# Patient Record
Sex: FEMALE | Race: BLACK OR AFRICAN AMERICAN
[De-identification: names, ages, dates, MRNs, and addresses within clinical notes are randomized per-mention and may not be internally consistent; named-entity substitution may affect disease eponyms.]

---

## 2019-06-30 ENCOUNTER — HOSPITAL ENCOUNTER (INPATIENT)
Dept: HOSPITAL 72 - EMR | Age: 79
LOS: 4 days | Discharge: HOME | DRG: 193 | End: 2019-07-04
Payer: MEDICARE

## 2019-06-30 VITALS — DIASTOLIC BLOOD PRESSURE: 73 MMHG | SYSTOLIC BLOOD PRESSURE: 148 MMHG

## 2019-06-30 VITALS — BODY MASS INDEX: 23.73 KG/M2 | WEIGHT: 139 LBS | HEIGHT: 64 IN

## 2019-06-30 VITALS — DIASTOLIC BLOOD PRESSURE: 84 MMHG | SYSTOLIC BLOOD PRESSURE: 147 MMHG

## 2019-06-30 VITALS — DIASTOLIC BLOOD PRESSURE: 95 MMHG | SYSTOLIC BLOOD PRESSURE: 146 MMHG

## 2019-06-30 VITALS — DIASTOLIC BLOOD PRESSURE: 97 MMHG | SYSTOLIC BLOOD PRESSURE: 177 MMHG

## 2019-06-30 DIAGNOSIS — I21.4: ICD-10-CM

## 2019-06-30 DIAGNOSIS — J18.9: Primary | ICD-10-CM

## 2019-06-30 DIAGNOSIS — I25.5: ICD-10-CM

## 2019-06-30 DIAGNOSIS — N17.9: ICD-10-CM

## 2019-06-30 DIAGNOSIS — R19.7: ICD-10-CM

## 2019-06-30 DIAGNOSIS — I11.0: ICD-10-CM

## 2019-06-30 DIAGNOSIS — I47.2: ICD-10-CM

## 2019-06-30 DIAGNOSIS — F01.50: ICD-10-CM

## 2019-06-30 DIAGNOSIS — I50.32: ICD-10-CM

## 2019-06-30 DIAGNOSIS — R74.0: ICD-10-CM

## 2019-06-30 LAB
ADD MANUAL DIFF: NO
ALBUMIN SERPL-MCNC: 4 G/DL (ref 3.4–5)
ALBUMIN/GLOB SERPL: 1.2 {RATIO} (ref 1–2.7)
ALP SERPL-CCNC: 120 U/L (ref 46–116)
ALT SERPL-CCNC: 225 U/L (ref 12–78)
ANION GAP SERPL CALC-SCNC: 10 MMOL/L (ref 5–15)
AST SERPL-CCNC: 119 U/L (ref 15–37)
BASOPHILS NFR BLD AUTO: 1.1 % (ref 0–2)
BILIRUB DIRECT SERPL-MCNC: 0.3 MG/DL (ref 0–0.3)
BILIRUB SERPL-MCNC: 1.1 MG/DL (ref 0.2–1)
BUN SERPL-MCNC: 31 MG/DL (ref 7–18)
CALCIUM SERPL-MCNC: 9 MG/DL (ref 8.5–10.1)
CHLORIDE SERPL-SCNC: 104 MMOL/L (ref 98–107)
CK MB SERPL-MCNC: 2.7 NG/ML (ref 0–3.6)
CK SERPL-CCNC: 220 U/L (ref 26–308)
CO2 SERPL-SCNC: 25 MMOL/L (ref 21–32)
CREAT SERPL-MCNC: 1.7 MG/DL (ref 0.55–1.3)
EOSINOPHIL NFR BLD AUTO: 0.1 % (ref 0–3)
ERYTHROCYTE [DISTWIDTH] IN BLOOD BY AUTOMATED COUNT: 14.4 % (ref 11.6–14.8)
GLOBULIN SER-MCNC: 3.3 G/DL
HCT VFR BLD CALC: 37.3 % (ref 37–47)
HGB BLD-MCNC: 11.9 G/DL (ref 12–16)
LYMPHOCYTES NFR BLD AUTO: 20.6 % (ref 20–45)
MCV RBC AUTO: 95 FL (ref 80–99)
MONOCYTES NFR BLD AUTO: 10.4 % (ref 1–10)
NEUTROPHILS NFR BLD AUTO: 67.9 % (ref 45–75)
PLATELET # BLD: 201 K/UL (ref 150–450)
POTASSIUM SERPL-SCNC: 4 MMOL/L (ref 3.5–5.1)
RBC # BLD AUTO: 3.92 M/UL (ref 4.2–5.4)
SODIUM SERPL-SCNC: 139 MMOL/L (ref 136–145)
WBC # BLD AUTO: 5.2 K/UL (ref 4.8–10.8)

## 2019-06-30 PROCEDURE — 96365 THER/PROPH/DIAG IV INF INIT: CPT

## 2019-06-30 PROCEDURE — 85651 RBC SED RATE NONAUTOMATED: CPT

## 2019-06-30 PROCEDURE — 71045 X-RAY EXAM CHEST 1 VIEW: CPT

## 2019-06-30 PROCEDURE — 87340 HEPATITIS B SURFACE AG IA: CPT

## 2019-06-30 PROCEDURE — 84484 ASSAY OF TROPONIN QUANT: CPT

## 2019-06-30 PROCEDURE — 87517 HEPATITIS B DNA QUANT: CPT

## 2019-06-30 PROCEDURE — 86695 HERPES SIMPLEX TYPE 1 TEST: CPT

## 2019-06-30 PROCEDURE — 85025 COMPLETE CBC W/AUTO DIFF WBC: CPT

## 2019-06-30 PROCEDURE — 83735 ASSAY OF MAGNESIUM: CPT

## 2019-06-30 PROCEDURE — 76700 US EXAM ABDOM COMPLETE: CPT

## 2019-06-30 PROCEDURE — 80053 COMPREHEN METABOLIC PANEL: CPT

## 2019-06-30 PROCEDURE — 86708 HEPATITIS A ANTIBODY: CPT

## 2019-06-30 PROCEDURE — 86140 C-REACTIVE PROTEIN: CPT

## 2019-06-30 PROCEDURE — 86709 HEPATITIS A IGM ANTIBODY: CPT

## 2019-06-30 PROCEDURE — 82150 ASSAY OF AMYLASE: CPT

## 2019-06-30 PROCEDURE — 83605 ASSAY OF LACTIC ACID: CPT

## 2019-06-30 PROCEDURE — 36415 COLL VENOUS BLD VENIPUNCTURE: CPT

## 2019-06-30 PROCEDURE — 86665 EPSTEIN-BARR CAPSID VCA: CPT

## 2019-06-30 PROCEDURE — 93005 ELECTROCARDIOGRAM TRACING: CPT

## 2019-06-30 PROCEDURE — 82553 CREATINE MB FRACTION: CPT

## 2019-06-30 PROCEDURE — 82248 BILIRUBIN DIRECT: CPT

## 2019-06-30 PROCEDURE — 83880 ASSAY OF NATRIURETIC PEPTIDE: CPT

## 2019-06-30 PROCEDURE — 83690 ASSAY OF LIPASE: CPT

## 2019-06-30 PROCEDURE — 82550 ASSAY OF CK (CPK): CPT

## 2019-06-30 PROCEDURE — 96361 HYDRATE IV INFUSION ADD-ON: CPT

## 2019-06-30 PROCEDURE — 86803 HEPATITIS C AB TEST: CPT

## 2019-06-30 PROCEDURE — 99291 CRITICAL CARE FIRST HOUR: CPT

## 2019-06-30 PROCEDURE — 74176 CT ABD & PELVIS W/O CONTRAST: CPT

## 2019-06-30 PROCEDURE — 86039 ANTINUCLEAR ANTIBODIES (ANA): CPT

## 2019-06-30 PROCEDURE — 87040 BLOOD CULTURE FOR BACTERIA: CPT

## 2019-06-30 PROCEDURE — 93306 TTE W/DOPPLER COMPLETE: CPT

## 2019-06-30 PROCEDURE — 87798 DETECT AGENT NOS DNA AMP: CPT

## 2019-06-30 RX ADMIN — DEXTROSE MONOHYDRATE SCH MLS/HR: 50 INJECTION, SOLUTION INTRAVENOUS at 23:55

## 2019-06-30 RX ADMIN — POTASSIUM CHLORIDE ONE MLS/HR: 200 INJECTION, SOLUTION INTRAVENOUS at 10:37

## 2019-06-30 RX ADMIN — DEXTROSE MONOHYDRATE SCH MLS/HR: 50 INJECTION, SOLUTION INTRAVENOUS at 16:45

## 2019-06-30 RX ADMIN — POTASSIUM CHLORIDE ONE MLS/HR: 200 INJECTION, SOLUTION INTRAVENOUS at 10:30

## 2019-06-30 NOTE — DIAGNOSTIC IMAGING REPORT
EXAM:

  XR Chest, 1 View.

 

CLINICAL HISTORY:

  COUGH

 

TECHNIQUE:

  Frontal view of the chest.

 

COMPARISON:

  No relevant prior studies available.

 

FINDINGS:

  Lungs: Suspected retrocardiac atelectasis versus airspace opacity, 

concerning for left lower lobe pneumonia.  Consider lateral chest 

radiograph for confirmation.  No pulmonary edema.  Scarring or 

atelectasis seen within the right lung base as well.

  Pleural spaces:  Unremarkable.  No pneumothorax.

  Heart: Mild cardiomegaly.

  Mediastinum: No mediastinal widening or shift.

  Bones: No acute osseous abnormality..

 

IMPRESSION:     

Possible left lower lobe pneumonia versus atelectasis.  Please correlate 

with respiratory exam.  Consider lateral chest radiograph for 

confirmation.

 

Mild cardiomegaly.

## 2019-06-30 NOTE — EMERGENCY ROOM REPORT
History of Present Illness


General


Chief Complaint:  Generalized Weakness


Source:  Patient, Family Member





Present Illness


HPI


Patient presents with family for reports of general weakness


Family also reports increased cough patient reports having dry throat


Denies any chest pain denies any vomiting or diarrhea


Patient is here visiting family


Denies any obvious fever denies any neck pain or photophobia patient complains 

of some mild epigastric discomfort as well


Denies any


Recent travel or trauma


Denies any neck pain or photophobia


Allergies:  


Coded Allergies:  


     No Known Allergies (Unverified , 6/30/19)





Patient History


Past Medical History:  see triage record


Pertinent Family History:  none


Reviewed Nursing Documentation:  PMH: Agreed; PSxH: Agreed





Nursing Documentation-PMH


Past Medical History:  No Stated History





Review of Systems


All Other Systems:  negative except mentioned in HPI





Physical Exam





Vital Signs








  Date Time  Temp Pulse Resp B/P (MAP) Pulse Ox O2 Delivery O2 Flow Rate FiO2


 


6/30/19 08:45 97.7 81 16 177/97 (123) 96 Room Air  








Sp02 EP Interpretation:  reviewed, normal


General Appearance:  well appearing, no apparent distress


Head:  normocephalic, atraumatic


Eyes:  bilateral eye PERRL, bilateral eye EOMI


ENT:  hearing grossly normal, normal pharynx, TMs + canals normal, uvula midline


Neck:  full range of motion, supple, no meningismus, no bony tend


Respiratory:  lungs clear, normal breath sounds, no rhonchi, no respiratory 

distress, no retraction, no accessory muscle use


Cardiovascular #1:  normal peripheral pulses, regular rate, rhythm, no edema, 

no gallop, no JVD, no murmur


Gastrointestinal:  normal bowel sounds, non tender, soft, no mass, no 

organomegaly, non-distended, no guarding, no hernia, no pulsatile mass, no 

rebound


Genitourinary:  no CVA tenderness


Musculoskeletal:  normal inspection


Neurologic:  oriented x3, responsive, CNs III-XII nml as tested, motor strength/

tone normal, sensory intact


Psychiatric:  mood/affect normal


Lymphatic:  normal inspection, no adenopathy





Procedures


Critical Care Time


Critical Care Time


40 minutes for multiple re-evaluations critical presentation with critical 

findings concerning for cardiac injury possible death not including any 

procedural time





Medical Decision Making


Diagnostic Impression:  


 Primary Impression:  


 Elevated troponin


 Additional Impressions:  


 Pneumonia


 Elevated transaminase level


ER Course


Patient is a fairly complex patient with multiple differential to consideration 

including but not limited to cardiac cardiopulmonary and vascular emergencies





Other infectious pathology also entertained


Patient's work-up reveals significant abnormalities including elevated troponin 

levels liver function tests are also elevated


Patient's x-ray shows right-sided infiltrate


With some question of mild congestion patient does not have any history of CHF 

and the clinical exam does not reveal any edema


Patient's


IV hydration status however it needs to be closely monitored as not to over 

hydrate the patient broad-spectrum antibiotics initiated





Ultrasound is ordered for inpatient care


And general surgery consultations also made





Labs








Test


  6/30/19


09:30 6/30/19


11:56


 


White Blood Count


  5.2 K/UL


(4.8-10.8) 


 


 


Red Blood Count


  3.92 M/UL


(4.20-5.40) 


 


 


Hemoglobin


  11.9 G/DL


(12.0-16.0) 


 


 


Hematocrit


  37.3 %


(37.0-47.0) 


 


 


Mean Corpuscular Volume 95 FL (80-99)  


 


Mean Corpuscular Hemoglobin


  30.3 PG


(27.0-31.0) 


 


 


Mean Corpuscular Hemoglobin


Concent 31.9 G/DL


(32.0-36.0) 


 


 


Red Cell Distribution Width


  14.4 %


(11.6-14.8) 


 


 


Platelet Count


  201 K/UL


(150-450) 


 


 


Mean Platelet Volume


  8.4 FL


(6.5-10.1) 


 


 


Neutrophils (%) (Auto)


  67.9 %


(45.0-75.0) 


 


 


Lymphocytes (%) (Auto)


  20.6 %


(20.0-45.0) 


 


 


Monocytes (%) (Auto)


  10.4 %


(1.0-10.0) 


 


 


Eosinophils (%) (Auto)


  0.1 %


(0.0-3.0) 


 


 


Basophils (%) (Auto)


  1.1 %


(0.0-2.0) 


 


 


Sodium Level


  139 MMOL/L


(136-145) 


 


 


Potassium Level


  4.0 MMOL/L


(3.5-5.1) 


 


 


Chloride Level


  104 MMOL/L


() 


 


 


Carbon Dioxide Level


  25 MMOL/L


(21-32) 


 


 


Anion Gap


  10 mmol/L


(5-15) 


 


 


Blood Urea Nitrogen


  31 mg/dL


(7-18) 


 


 


Creatinine


  1.7 MG/DL


(0.55-1.30) 


 


 


Estimat Glomerular Filtration


Rate  mL/min (>60) 


  


 


 


Glucose Level


  167 MG/DL


() 


 


 


Lactic Acid Level


  2.20 mmol/L


(0.4-2.0) 1.50 mmol/L


(0.66-2.22)


 


Calcium Level


  9.0 MG/DL


(8.5-10.1) 


 


 


Total Bilirubin


  1.1 MG/DL


(0.2-1.0) 


 


 


Direct Bilirubin


  0.3 MG/DL


(0.0-0.3) 


 


 


Aspartate Amino Transf


(AST/SGOT) 119 U/L


(15-37) 


 


 


Alanine Aminotransferase


(ALT/SGPT) 225 U/L


(12-78) 


 


 


Alkaline Phosphatase


  120 U/L


() 


 


 


Total Creatine Kinase


  220 U/L


() 


 


 


Creatine Kinase MB


  2.7 NG/ML


(0.0-3.6) 


 


 


Creatine Kinase MB Relative


Index 1.2 


  


 


 


Troponin I


  0.069 ng/mL


(0.000-0.056) 


 


 


Total Protein


  7.3 G/DL


(6.4-8.2) 


 


 


Albumin


  4.0 G/DL


(3.4-5.0) 


 


 


Globulin 3.3 g/dL  


 


Albumin/Globulin Ratio 1.2 (1.0-2.7)  


 


Lipase


  136 U/L


() 


 








EKG Diagnostic Results


Rate:  normal


Rhythm:  NSR


ST Segments:  other - Nonspecific ST/T wave changes





Rhythm Strip Diag. Results


EP Interpretation:  yes


Rate:  78


Rhythm:  NSR, no PVC's, no ectopy





Chest X-Ray Diagnostic Results


Chest X-Ray Diagnostic Results :  


   Chest X-Ray Ordered:  Yes


   # of Views/Limited/Complete:  1 View


   Indication:  Shortness of Breath


   EP Interpretation:  Yes


   Interpretation:  no effusion, no pneumothorax, other - Left lower lobe 

atelectasis cardiomegaly


   Impression:  Other - Left lower lobe infiltrate


   Electronically Signed by:  Anson Rebolledo DO





Last Vital Signs








  Date Time  Temp Pulse Resp B/P (MAP) Pulse Ox O2 Delivery O2 Flow Rate FiO2


 


6/30/19 08:45 97.7 81 16 177/97 (123) 96 Room Air  








Status:  improved


Disposition:  ADMITTED AS INPATIENT


Condition:  Serious











Anson Rebolledo DO Jun 30, 2019 09:20

## 2019-06-30 NOTE — NUR
NURSE NOTES:

Pt. came on the floor via gurney from ER. AOx3. RA, denies any pain or SOB. Cardiac monitor 
applied. IV on R AC 20g patent and intact. Skin intact. Belongings to be sent home with son. 
Pt. made comfortable in bed. Oriented Pt. to room and hospital protocols. Fall precaution in 
place. Bed on low position, side rails upx2, brakes engaged. Call light within reach. 
Verbalized understanding.

## 2019-06-30 NOTE — CONSULTATION
History of Present Illness


General


Date patient seen:  Jun 30, 2019


Reason for Hospitalization:  Generalized Weakness





Present Illness


HPI


78 year old very pleasant female presented with family with complaints of 

worsening cough for 2 weeks.  first noted a little cough 2 weeks ago and since 

worsening.  was feeling weak and unwell so came to ED for evaluation.  no n/v/f/

c.  having diarrhea as well.  in ED noted to have abnormal lft's surgery called 

to evaluate. patient seen, chart reviewed, patient examined.


Allergies:  


Coded Allergies:  


     No Known Allergies (Unverified , 6/30/19)





Medication History


Scheduled


No Known Medications* (NKM - No Known Medications*), 0 ., (Reported)





Patient History


History Provided By:  Patient, Family Member, Medical Record, PMD


Healthcare decision maker


Rudolph Boles (son)


Resuscitation status





Advanced Directive on File








Past Medical/Surgical History


Past Medical/Surgical History:  


(1) Pneumonia


(2) Elevated transaminase level


(3) Elevated troponin





Review of Systems


Review of Symptoms


General ROS: no weight loss or fever


Psychological ROS: no depression or mood changes, no memory loss


Ophthalmic ROS: no visual changes or eye irritation


ENT ROS: no nasal congestion, hearing loss, dizziness


Allergy and Immunology ROS: no allergic symptoms or urticaria


Hematological and Lymphatic ROS: no swollen glands, unusual bleeding or bruising


Endocrine ROS: no polyuria, polydipsia, weight changes, temperature intolerance


Respiratory ROS: no cough, shortness of breath, or wheezing


Cardiovascular ROS: no chest pain or dyspnea on exertion


Gastrointestinal ROS: denies abdominal pain, no bright red blood in stool.


Musculoskeletal ROS: no myalgias or arthralgias


Neurological ROS: no TIA or stroke symptoms


Dermatological ROS: no new or changing skin lesions, rashes or pruritis





Physical Exam


Physical Exam


General appearance:  alert, cooperative, no distress, appears stated age


Head:  Normocephalic, without obvious abnormality, atraumatic


Eyes:  conjunctivae/corneas clear. PERRL, EOM's intact. Fundi benign


Throat:  Lips, mucosa, and tongue normal. Teeth and gums normal


Neck:  supple, symmetrical, trachea midline, no adenopathy, thyroid: not 

enlarged, symmetric, no tenderness/mass/nodules, no carotid bruit and no JVD


Lungs:  clear to auscultation bilaterally


Heart:  regular rate and rhythm, S1, S2 normal, no murmur, click, rub or gallop


Abdomen:  soft, mild RUQ discomfort/tender. Bowel sounds normal. No masses,  no 

organomegaly


Extremities:  extremities normal, atraumatic, no cyanosis or edema


Pulses:  2+ and symmetric


Skin:  Skin color, texture, turgor normal. No rashes or lesions


Neurologic:  Grossly normal





Last 24 Hour Vital Signs








  Date Time  Temp Pulse Resp B/P (MAP) Pulse Ox O2 Delivery O2 Flow Rate FiO2


 


6/30/19 13:05      Room Air  


 


6/30/19 12:34 98.0 84 20 146/68 100 Room Air  


 


6/30/19 09:00 97.7 87 16 177/97 96 Room Air  


 


6/30/19 09:00  81 16   Room Air  


 


6/30/19 08:45 97.7 81 16 177/97 (123) 96 Room Air  











Laboratory Tests








Test


  6/30/19


09:30 6/30/19


11:56


 


White Blood Count


  5.2 K/UL


(4.8-10.8) 


 


 


Red Blood Count


  3.92 M/UL


(4.20-5.40)  L 


 


 


Hemoglobin


  11.9 G/DL


(12.0-16.0)  L 


 


 


Hematocrit


  37.3 %


(37.0-47.0) 


 


 


Mean Corpuscular Volume 95 FL (80-99)   


 


Mean Corpuscular Hemoglobin


  30.3 PG


(27.0-31.0) 


 


 


Mean Corpuscular Hemoglobin


Concent 31.9 G/DL


(32.0-36.0)  L 


 


 


Red Cell Distribution Width


  14.4 %


(11.6-14.8) 


 


 


Platelet Count


  201 K/UL


(150-450) 


 


 


Mean Platelet Volume


  8.4 FL


(6.5-10.1) 


 


 


Neutrophils (%) (Auto)


  67.9 %


(45.0-75.0) 


 


 


Lymphocytes (%) (Auto)


  20.6 %


(20.0-45.0) 


 


 


Monocytes (%) (Auto)


  10.4 %


(1.0-10.0)  H 


 


 


Eosinophils (%) (Auto)


  0.1 %


(0.0-3.0) 


 


 


Basophils (%) (Auto)


  1.1 %


(0.0-2.0) 


 


 


Sodium Level


  139 MMOL/L


(136-145) 


 


 


Potassium Level


  4.0 MMOL/L


(3.5-5.1) 


 


 


Chloride Level


  104 MMOL/L


() 


 


 


Carbon Dioxide Level


  25 MMOL/L


(21-32) 


 


 


Anion Gap


  10 mmol/L


(5-15) 


 


 


Blood Urea Nitrogen


  31 mg/dL


(7-18)  H 


 


 


Creatinine


  1.7 MG/DL


(0.55-1.30)  H 


 


 


Estimat Glomerular Filtration


Rate  mL/min (>60)  


  


 


 


Glucose Level


  167 MG/DL


()  H 


 


 


Lactic Acid Level


  2.20 mmol/L


(0.4-2.0)  H 1.50 mmol/L


(0.66-2.22)


 


Calcium Level


  9.0 MG/DL


(8.5-10.1) 


 


 


Total Bilirubin


  1.1 MG/DL


(0.2-1.0)  H 


 


 


Direct Bilirubin


  0.3 MG/DL


(0.0-0.3) 


 


 


Aspartate Amino Transf


(AST/SGOT) 119 U/L


(15-37)  H 


 


 


Alanine Aminotransferase


(ALT/SGPT) 225 U/L


(12-78)  H 


 


 


Alkaline Phosphatase


  120 U/L


()  H 


 


 


Total Creatine Kinase


  220 U/L


() 


 


 


Creatine Kinase MB


  2.7 NG/ML


(0.0-3.6) 


 


 


Creatine Kinase MB Relative


Index 1.2  


  


 


 


Troponin I


  0.069 ng/mL


(0.000-0.056) 


 


 


Total Protein


  7.3 G/DL


(6.4-8.2) 


 


 


Albumin


  4.0 G/DL


(3.4-5.0) 


 


 


Globulin 3.3 g/dL   


 


Albumin/Globulin Ratio 1.2 (1.0-2.7)   


 


Lipase


  136 U/L


() 


 








Height (Feet):  5


Height (Inches):  4.00


Weight (Pounds):  140





Assessment/Plan


Problem List:  


(1) Pneumonia


ICD Codes:  J18.9 - Pneumonia, unspecified organism


SNOMED:  454353050, 308936308


(2) Elevated transaminase level


Assessment & Plan:  78F with elevated lft's


etiology unknown


diarrhea


pna


mild RUQ discomfort





US abd complete ordered and pending


will review meds


trend labs


okay for diet


thank you


will follow with recs


ICD Codes:  R74.0 - Nonspecific elevation of levels of transaminase and lactic 

acid dehydrogenase [LDH]


SNOMED:  186600296, 079345044


(3) Elevated troponin


ICD Codes:  R74.8 - Abnormal levels of other serum enzymes


SNOMED:  431404951, 205785800, [id number]











Yonis Dickens Jun 30, 2019 13:45

## 2019-06-30 NOTE — NUR
ED Nurse Note:



TELE UNIT CALLED FOR PT REPORT. REPORT GIVEN TO LOPEZ BORJAS. PER PATRICIA, CHARGE 
NURSE, BED IS NOT READY. WILL CALL BACK WHEN READY.

## 2019-06-30 NOTE — NUR
ED Nurse Note:



Patient walked in to ER with son from son's home due to general weakness. pt 
aao x4 and ambulatory. skin clean and intact. calm and cooperative. [place] 
[place] state and visiting her son but noted increased weakness. pt denied 
pain. pt is in gown and on cardiac monitor.

## 2019-06-30 NOTE — NUR
NURSE NOTES:

patient received. patient in no acute distress at this time. patient complains of no pain at 
this time.  patient awake alert and oriented x3-4. patient fall precaution, band on arm. 
patient oriented to room and call light. IV intact patent and asymptomatic. bed in lowest 
position and locked. call light within reach. will continue to monitor.

## 2019-06-30 NOTE — HISTORY AND PHYSICAL REPORT
DATE OF ADMISSION:  06/30/2019

HISTORY OF PRESENT ILLNESS:  Abdominal pain and cough.



HISTORY OF PRESENT ILLNESS:  The patient is a 78-year-old female.  She has

no past medical history presented with complaints of one week of cough and

three days of abdominal pain.  According to the patient, she has had a

nonproductive cough for the last week.  Three days ago, she has developed

abdominal pain with some diarrhea.  She eventually presented to the

emergency room.  On evaluation there, she had an x-ray that showed a left

lower lobe infiltrate.  She also was noted to have acute renal failure

with a creatinine of 1.7 and elevated liver function tests and troponin of

0.069.  The patient has been started on IV hydration, broad-spectrum IV

antibiotics.  She is now admitted for further evaluation and care.  The

patient states she has abdominal pain, it has been intermittent.  Diarrhea

has been mostly greenish and watery.  The patient denies any melena or

bright red blood.



PAST MEDICAL HISTORY:  None.



PAST SURGICAL HISTORY:  None.



CURRENT MEDICATIONS:  None.



FAMILY HISTORY:  None.



SOCIAL HISTORY:  The patient drinks socially.  No drugs.



REVIEW OF SYSTEMS:  GENERAL:  No fevers, chills.    HEENT:  No headaches or

visual changes.  CARDIOPULMONARY:  No chest pain.  Positive shortness of

breath, cough.    GASTROINTESTINAL:  Positive abdominal pain and diarrhea.

GENITOURINARY:  No urgency or frequency.  MUSCULOSKELETAL:  No joint pain

or swelling.  NEUROLOGIC:  No evidence of seizures.



PHYSICAL EXAMINATION:

VITAL SIGNS:  Temperature 97.7, pulse 78, respirations 20, and blood

pressure 140/73.

GENERAL:  The patient is well developed, no apparent distress.

HEART:  Regular rate and rhythm.

LUNGS:  Clear.

ABDOMEN:  Soft.  Minimally tender in the right upper quadrant.  There is no

rebound or guarding.

EXTREMITIES:  Without clubbing, cyanosis, or edema.



LABORATORY DATA:  Sodium 139, potassium 4, BUN 31, creatinine 1.7.

Bilirubin of 1.1.  , ALT of 225, alkaline phosphatase 120.  Lactic

acid was 2.2.  Troponin 0.069.  EKG showed a sinus rhythm.



ASSESSMENT:  This is a pleasant female, admitted with complaints of cough,

secondary to pneumonia, acute renal failure, diarrhea, and transaminitis

unclear etiology, possible acute myocardial infarction.



PLAN:  IV hydration.  Broad-spectrum IV antibiotics.  Monitor renal

function.  CT scan of the abdomen.  Surgical and Cardiology and Infectious

Disease consultations to be obtained.  We will monitor the patient's chest

x-ray.  Cultures will be followed up.  LFTs will also be monitored.  The

patient will be started on DVT and stress ulcer prophylaxes.









  ______________________________________________

  Marky Kohler M.D.





DR:  RICHARD

D:  06/30/2019 19:41

T:  06/30/2019 20:09

JOB#:  [id number]

CC:

## 2019-07-01 VITALS — SYSTOLIC BLOOD PRESSURE: 144 MMHG | DIASTOLIC BLOOD PRESSURE: 69 MMHG

## 2019-07-01 VITALS — DIASTOLIC BLOOD PRESSURE: 90 MMHG | SYSTOLIC BLOOD PRESSURE: 150 MMHG

## 2019-07-01 VITALS — SYSTOLIC BLOOD PRESSURE: 163 MMHG | DIASTOLIC BLOOD PRESSURE: 103 MMHG

## 2019-07-01 VITALS — SYSTOLIC BLOOD PRESSURE: 142 MMHG | DIASTOLIC BLOOD PRESSURE: 92 MMHG

## 2019-07-01 VITALS — SYSTOLIC BLOOD PRESSURE: 119 MMHG | DIASTOLIC BLOOD PRESSURE: 81 MMHG

## 2019-07-01 VITALS — DIASTOLIC BLOOD PRESSURE: 89 MMHG | SYSTOLIC BLOOD PRESSURE: 160 MMHG

## 2019-07-01 VITALS — DIASTOLIC BLOOD PRESSURE: 86 MMHG | SYSTOLIC BLOOD PRESSURE: 140 MMHG

## 2019-07-01 LAB
ADD MANUAL DIFF: NO
ALBUMIN SERPL-MCNC: 3.5 G/DL (ref 3.4–5)
ALBUMIN/GLOB SERPL: 1.1 {RATIO} (ref 1–2.7)
ALP SERPL-CCNC: 116 U/L (ref 46–116)
ALT SERPL-CCNC: 242 U/L (ref 12–78)
AMYLASE SERPL-CCNC: 123 U/L (ref 25–115)
ANION GAP SERPL CALC-SCNC: 14 MMOL/L (ref 5–15)
AST SERPL-CCNC: 126 U/L (ref 15–37)
BASOPHILS NFR BLD AUTO: 0.8 % (ref 0–2)
BILIRUB DIRECT SERPL-MCNC: 0.3 MG/DL (ref 0–0.3)
BILIRUB SERPL-MCNC: 1.4 MG/DL (ref 0.2–1)
BUN SERPL-MCNC: 28 MG/DL (ref 7–18)
CALCIUM SERPL-MCNC: 9.1 MG/DL (ref 8.5–10.1)
CHLORIDE SERPL-SCNC: 105 MMOL/L (ref 98–107)
CO2 SERPL-SCNC: 22 MMOL/L (ref 21–32)
CREAT SERPL-MCNC: 1.7 MG/DL (ref 0.55–1.3)
EOSINOPHIL NFR BLD AUTO: 0.1 % (ref 0–3)
ERYTHROCYTE [DISTWIDTH] IN BLOOD BY AUTOMATED COUNT: 14.4 % (ref 11.6–14.8)
GLOBULIN SER-MCNC: 3.1 G/DL
HCT VFR BLD CALC: 34.1 % (ref 37–47)
HGB BLD-MCNC: 11.2 G/DL (ref 12–16)
LYMPHOCYTES NFR BLD AUTO: 15.6 % (ref 20–45)
MCV RBC AUTO: 95 FL (ref 80–99)
MONOCYTES NFR BLD AUTO: 11.3 % (ref 1–10)
NEUTROPHILS NFR BLD AUTO: 72.3 % (ref 45–75)
PLATELET # BLD: 182 K/UL (ref 150–450)
POTASSIUM SERPL-SCNC: 4.1 MMOL/L (ref 3.5–5.1)
RBC # BLD AUTO: 3.59 M/UL (ref 4.2–5.4)
SODIUM SERPL-SCNC: 141 MMOL/L (ref 136–145)
WBC # BLD AUTO: 6.9 K/UL (ref 4.8–10.8)

## 2019-07-01 RX ADMIN — METOPROLOL TARTRATE SCH MG: 25 TABLET, FILM COATED ORAL at 20:09

## 2019-07-01 RX ADMIN — DEXTROSE MONOHYDRATE SCH MLS/HR: 50 INJECTION, SOLUTION INTRAVENOUS at 14:53

## 2019-07-01 RX ADMIN — DEXTROSE MONOHYDRATE SCH MLS/HR: 50 INJECTION, SOLUTION INTRAVENOUS at 21:01

## 2019-07-01 RX ADMIN — DEXTROSE MONOHYDRATE SCH MLS/HR: 50 INJECTION, SOLUTION INTRAVENOUS at 06:19

## 2019-07-01 RX ADMIN — METOPROLOL TARTRATE SCH MG: 25 TABLET, FILM COATED ORAL at 17:33

## 2019-07-01 NOTE — SURGERY PROGRESS NOTE
Surgery Progress Note


Subjective


Additional Comments


no acute events.  stable. labs noted.  t bili elevated. direct okay.  lft's 

improving.





Objective





Last 24 Hour Vital Signs








  Date Time  Temp Pulse Resp B/P (MAP) Pulse Ox O2 Delivery O2 Flow Rate FiO2


 


7/1/19 12:00 98.1 94 18 119/81 (94) 95   


 


7/1/19 12:00  81      


 


7/1/19 09:00      Room Air  


 


7/1/19 08:00 99.4 79 17 160/89 (112) 97   


 


7/1/19 08:00  78      


 


7/1/19 04:00  79      


 


7/1/19 04:00 97.7 91 20 150/90 (110) 96   


 


7/1/19 00:00 97.9 78 18 140/86 (104) 98   


 


6/30/19 21:00      Room Air  


 


6/30/19 20:00 98.5 82 18 147/84 (105) 97   


 


6/30/19 20:00  78      


 


6/30/19 16:00  78      


 


6/30/19 16:00 97.7 70 20 148/73 (98) 97   








I&O











Intake and Output  


 


 6/30/19 7/1/19





 19:00 07:00


 


Intake Total 1120 ml 


 


Balance 1120 ml 


 


  


 


Intake Oral 120 ml 


 


IV Total 1000 ml 


 


# Voids 1 3


 


# Bowel Movements  1








Cardiovascular:  RSR


Respiratory:  clear


Abdomen:  soft, flat, non-tender, present bowel sounds, non-distended


Extremities:  no edema, no tenderness, no cyanosis





Laboratory Tests








Test


  7/1/19


06:20 7/1/19


11:19


 


White Blood Count


  6.9 K/UL


(4.8-10.8) 


 


 


Red Blood Count


  3.59 M/UL


(4.20-5.40)  L 


 


 


Hemoglobin


  11.2 G/DL


(12.0-16.0)  L 


 


 


Hematocrit


  34.1 %


(37.0-47.0)  L 


 


 


Mean Corpuscular Volume 95 FL (80-99)   


 


Mean Corpuscular Hemoglobin


  31.0 PG


(27.0-31.0) 


 


 


Mean Corpuscular Hemoglobin


Concent 32.7 G/DL


(32.0-36.0) 


 


 


Red Cell Distribution Width


  14.4 %


(11.6-14.8) 


 


 


Platelet Count


  182 K/UL


(150-450) 


 


 


Mean Platelet Volume


  8.7 FL


(6.5-10.1) 


 


 


Neutrophils (%) (Auto)


  72.3 %


(45.0-75.0) 


 


 


Lymphocytes (%) (Auto)


  15.6 %


(20.0-45.0)  L 


 


 


Monocytes (%) (Auto)


  11.3 %


(1.0-10.0)  H 


 


 


Eosinophils (%) (Auto)


  0.1 %


(0.0-3.0) 


 


 


Basophils (%) (Auto)


  0.8 %


(0.0-2.0) 


 


 


Erythrocyte Sedimentation Rate


  20 MM/HR


(0-30) 


 


 


Sodium Level


  141 MMOL/L


(136-145) 


 


 


Potassium Level


  4.1 MMOL/L


(3.5-5.1) 


 


 


Chloride Level


  105 MMOL/L


() 


 


 


Carbon Dioxide Level


  22 MMOL/L


(21-32) 


 


 


Anion Gap


  14 mmol/L


(5-15) 


 


 


Blood Urea Nitrogen


  28 mg/dL


(7-18)  H 


 


 


Creatinine


  1.7 MG/DL


(0.55-1.30)  H 


 


 


Estimat Glomerular Filtration


Rate  mL/min (>60)  


  


 


 


Glucose Level


  127 MG/DL


()  H 


 


 


Calcium Level


  9.1 MG/DL


(8.5-10.1) 


 


 


Total Bilirubin


  1.4 MG/DL


(0.2-1.0)  H 


 


 


Direct Bilirubin


  0.3 MG/DL


(0.0-0.3) 


 


 


Aspartate Amino Transf


(AST/SGOT) 126 U/L


(15-37)  H 


 


 


Alanine Aminotransferase


(ALT/SGPT) 242 U/L


(12-78)  H 


 


 


Alkaline Phosphatase


  116 U/L


() 


 


 


C-Reactive Protein,


Quantitative < 0.4 mg/dL


(0.00-0.90) 


 


 


Total Protein


  6.6 G/DL


(6.4-8.2) 


 


 


Albumin


  3.5 G/DL


(3.4-5.0) 


 


 


Globulin 3.1 g/dL   


 


Albumin/Globulin Ratio 1.1 (1.0-2.7)   


 


Amylase Level


  123 U/L


()  H 


 


 


Hepatitis A Antibody Total  Pending  


 


Hepatitis B Surface Antigen  Pending  


 


Hepatitis B Surface Antibody,


Quant 


  Pending  


 


 


Hepatitis C Antibody  Pending  











Plan


Problems:  


(1) Pneumonia


(2) Elevated transaminase level


Assessment & Plan:  78F with elevated lft's


etiology unknown


diarrhea


pna


mild RUQ discomfort





US abd complete ordered and pending


CT A/P pending 


will review meds


trend labs


okay for diet


thank you


will follow with recs 





(3) Elevated troponin











Yonis Dickens Jul 1, 2019 14:45

## 2019-07-01 NOTE — GENERAL PROGRESS NOTE
Assessment/Plan


Problem List:  


(1) Pneumonia


ICD Codes:  J18.9 - Pneumonia, unspecified organism


SNOMED:  478424866, 127065320


(2) Elevated transaminase level


ICD Codes:  R74.0 - Nonspecific elevation of levels of transaminase and lactic 

acid dehydrogenase [LDH]


SNOMED:  710168320, 108730200


(3) Elevated troponin


ICD Codes:  R74.8 - Abnormal levels of other serum enzymes


SNOMED:  087325892, 880760141, 373881932


Status:  stable, progressing


Assessment/Plan:


abx for pna


check hep panel. 


follow up bad us





Subjective


ROS Limited/Unobtainable:  No


Constitutional:  Reports: malaise, weakness


HEENT:  Reports: no symptoms


Cardiovascular:  Reports: no symptoms


Respiratory:  Reports: no symptoms


Gastrointestinal/Abdominal:  Reports: abdominal pain


Genitourinary:  Reports: no symptoms


Neurologic/Psychiatric:  Reports: no symptoms


Endocrine:  Reports: no symptoms


Hematologic/Lymphatic:  Reports: no symptoms


Allergies:  


Coded Allergies:  


     No Known Allergies (Unverified , 6/30/19)


All Systems:  reviewed and negative except above


Subjective


less abd pain today. eating breakfast. no cp/sob.





Objective





Last 24 Hour Vital Signs








  Date Time  Temp Pulse Resp B/P (MAP) Pulse Ox O2 Delivery O2 Flow Rate FiO2


 


7/1/19 04:00  79      


 


7/1/19 04:00 97.7 91 20 150/90 (110) 96   


 


7/1/19 00:00 97.9 78 18 140/86 (104) 98   


 


6/30/19 21:00      Room Air  


 


6/30/19 20:00 98.5 82 18 147/84 (105) 97   


 


6/30/19 20:00  78      


 


6/30/19 16:00  78      


 


6/30/19 16:00 97.7 70 20 148/73 (98) 97   


 


6/30/19 13:05      Room Air  


 


6/30/19 12:57      Room Air  


 


6/30/19 12:34 98.0 84 20 146/68 100 Room Air  


 


6/30/19 12:00  81      


 


6/30/19 12:00 97.5 78 20 146/95 (112) 98   


 


6/30/19 09:00 97.7 87 16 177/97 96 Room Air  


 


6/30/19 09:00  81 16   Room Air  


 


6/30/19 08:45 97.7 81 16 177/97 (123) 96 Room Air  

















Intake and Output  


 


 6/30/19 7/1/19





 19:00 07:00


 


Intake Total 1120 ml 


 


Balance 1120 ml 


 


  


 


Intake Oral 120 ml 


 


IV Total 1000 ml 


 


# Voids 1 3


 


# Bowel Movements  1








Laboratory Tests


6/30/19 09:30: 


White Blood Count 5.2, Red Blood Count 3.92L, Hemoglobin 11.9L, Hematocrit 37.3

, Mean Corpuscular Volume 95, Mean Corpuscular Hemoglobin 30.3, Mean 

Corpuscular Hemoglobin Concent 31.9L, Red Cell Distribution Width 14.4, 

Platelet Count 201, Mean Platelet Volume 8.4, Neutrophils (%) (Auto) 67.9, 

Lymphocytes (%) (Auto) 20.6, Monocytes (%) (Auto) 10.4H, Eosinophils (%) (Auto) 

0.1, Basophils (%) (Auto) 1.1, Sodium Level 139, Potassium Level 4.0, Chloride 

Level 104, Carbon Dioxide Level 25, Anion Gap 10, Blood Urea Nitrogen 31H, 

Creatinine 1.7H, Estimat Glomerular Filtration Rate , Glucose Level 167H, 

Lactic Acid Level 2.20H, Calcium Level 9.0, Total Bilirubin 1.1H, Direct 

Bilirubin 0.3, Aspartate Amino Transf (AST/SGOT) 119H, Alanine Aminotransferase 

(ALT/SGPT) 225H, Alkaline Phosphatase 120H, Total Creatine Kinase 220, Creatine 

Kinase MB 2.7, Creatine Kinase MB Relative Index 1.2, Troponin I 0.069H, Total 

Protein 7.3, Albumin 4.0, Globulin 3.3, Albumin/Globulin Ratio 1.2, Lipase 136


6/30/19 11:56: Lactic Acid Level 1.50


7/1/19 06:20: 


White Blood Count 6.9, Red Blood Count 3.59L, Hemoglobin 11.2L, Hematocrit 34.1L

, Mean Corpuscular Volume 95, Mean Corpuscular Hemoglobin 31.0, Mean 

Corpuscular Hemoglobin Concent 32.7, Red Cell Distribution Width 14.4, Platelet 

Count 182, Mean Platelet Volume 8.7, Neutrophils (%) (Auto) 72.3, Lymphocytes (%

) (Auto) 15.6L, Monocytes (%) (Auto) 11.3H, Eosinophils (%) (Auto) 0.1, 

Basophils (%) (Auto) 0.8, Sodium Level 141, Potassium Level 4.1, Chloride Level 

105, Carbon Dioxide Level 22, Anion Gap 14, Blood Urea Nitrogen 28H, Creatinine 

1.7H, Estimat Glomerular Filtration Rate , Glucose Level 127H, Calcium Level 9.1

, Total Bilirubin 1.4H, Direct Bilirubin 0.3, Aspartate Amino Transf (AST/SGOT) 

126H, Alanine Aminotransferase (ALT/SGPT) 242H, Alkaline Phosphatase 116, Total 

Protein 6.6, Albumin 3.5, Globulin 3.1, Albumin/Globulin Ratio 1.1, Erythrocyte 

Sedimentation Rate [Pending], C-Reactive Protein, Quantitative < 0.4, Amylase 

Level 123H


Height (Feet):  5


Height (Inches):  4.00


Weight (Pounds):  140


General Appearance:  WD/WN, alert


Neck:  supple


Cardiovascular:  normal rate, regular rhythm


Respiratory/Chest:  chest wall non-tender, lungs clear, normal breath sounds


Abdomen:  normal bowel sounds, non tender, soft, no organomegaly


Edema:  no edema noted Arm (L), no edema noted Arm (R), no edema noted Leg (L), 

no edema noted Leg (R), no edema noted Pedal (L), no edema noted Pedal (R), no 

edema noted Generalized











Marky Kohler MD Jul 1, 2019 08:39

## 2019-07-01 NOTE — NUR
NURSE NOTES:

Received report from April/RN, Patient is awake and alert, No acute distress/SOB noted. 
Checked IV; patent, no bleeding or infiltration noted. Belonging in reach, Bed in lowest 
position and locked, Call light within reach. Will continue plan of care.

## 2019-07-01 NOTE — NUR
CASE MANAGEMENT: INITIAL REVIEW 



77 YO F PRESENTED TO ED FROM HOME 



CC: GEN WEAKNESS



PMHx: DENIES 



SI: ELEVATED TROPONIN 

T 97.7 HR 81 RR 16 B/P 177/97 SATS 96% ON RA 

BUN 31 CR 1.7  TBILI 1.1 LACTIC ACID 2.2    TROPONIN 0.069 



IS: NS BOLUS X2 

LEVAQUIN IV X1 

CXR (IMPRESSION:Possible left lower lobe pneumonia versus atelectasis) 



*** PATIENT ADMITTED TO TELE 6/30/2019 @ 1228****



DCP: PATIENT TO BE DISCHARGED TO HOME ONCE MEDICALLY CLEARED. 



PLAN OF CARE: 

CT ABD/PELVIS 



7/1/2019



SI: ELEVATED TROPONIN 

T 99.4 HR 79 RR 17 B/P 160/89 SATS 97% ON RA 

BUN 28 CR 1.7  TBILI 1.4   



IS: IVF @ 75 mL/HR 

ZOSYN IV Q8H 

PROTONIX PO QD 



***TELE STATUS****



DCP: PATIENT TO BE DISCHARGED TO HOME ONCE MEDICALLY CLEARED. 



PLAN OF CARE: 

CT ABD/PELVIS >>> STILL PENDING 

 ABD 

-------------------------------------------------------------------------------

Addendum: 07/01/19 at 1128 by Claudia Soto CM

-------------------------------------------------------------------------------

INTERQUAL MET

## 2019-07-01 NOTE — DIAGNOSTIC IMAGING REPORT
Indication: Abdominal pain

 

Technique: Gray-scale and duplex images of the upper abdomen were obtained. Doppler

interrogation of the pancreatic and hepatic vessels

 

Comparison: none

 

Findings: Bilateral pleural effusions are noted.   Gallbladder is unremarkable,

without stones, wall thickening, nor pericholecystic fluid.  Sonographic Lynch's

sign is negative.  Common bile duct measures 3 mm in diameter.  No intrahepatic

biliary ductal dilatation.  Liver demonstrates normal echogenicity, no focal

abnormality.   Portal vein and hepatic veins are patent. However, to and fro flow is

noted in the portal vein. There is mild dilatation of the inferior vena cava and

hepatic veins Pancreas is unremarkable.    Spleen is unremarkable.  Left kidney

measures 9.1 cm in length.  Right kidney measures 10.8 cm length.  Both kidneys

demonstrate normal echogenicity.  There is no hydronephrosis.   No focal abnormality

. Non-aneurysmal abdominal aorta .

 

Impression: Negative for gallstones or dilated bile ducts

 

Bilateral pleural effusions

 

To and fro flow in the portal vein could indicate portal hypertension

 

Mildly dilated hepatic veins could indicate central venous hypertension

## 2019-07-01 NOTE — DIAGNOSTIC IMAGING REPORT
Indication: Cough

 

Technique: One view of the chest

 

Comparison: 6/30/2019

 

Findings: Bilateral atelectatic changes persist. There is increasing interstitial and

consolidative opacities in the bilateral perihilar regions, right lateral lung base,

and left infrahilar region. The heart remains enlarged. There are probably small

bilateral pleural effusions.

 

Impression: Increasing parenchymal infiltrates versus edema, over one day

## 2019-07-01 NOTE — NUR
NURSE NOTES:

 patient complained of hot flashes. said was very warm. put fan on and put cold compresses 
on her. we also changed her room so that the patient in the next bed would not be disturbed. 
patient is now much better. doesn't complain that she is hot any longer. will continue to 
monitor.

## 2019-07-01 NOTE — DIAGNOSTIC IMAGING REPORT
Indication: Abdominal pain and cough

 

Technique: Spiral acquisitions obtained through the abdomen and pelvis. Patient given

oral contrast. No IV contrast utilized,  per referring physician request..

Multiplanar reconstructions were generated. Total dose length product 560.07 mGycm.

CTDIvol(s) 11.8 mGy. Dose reduction achieved using automated exposure control

 

 

Comparison: None

 

Findings: Normal appendix. No definite evidence of diverticulosis or diverticulitis.

Ingested contrast has traversed only a portion of the small bowel. However, no small

bowel distention or small bowel wall thickening is evident. Trace fluid is seen

within the pelvis. No free intraperitoneal gas. The distal esophagus, stomach,

duodenum are unremarkable.

 

The lack of IV contrast limits assessment of the solid organs. The liver demonstrates

scattered calcifications, and the spleen demonstrates a calcification in the upper

pole. The gallbladder, bile ducts, pancreas, adrenals are unremarkable. Cysts are

seen in the lower pole right kidney. Left kidney is unremarkable. No retroperitoneal

or mesenteric mass or adenopathy. The uterus contains multiple calcifications. The

adnexal structures are unremarkable. No pelvic mass or adenopathy.

 

There is mild edema of the subcutaneous fat. There are bilateral moderate-sized

pleural effusions. The heart is enlarged. The included lung bases demonstrate

atelectatic changes and possibly some consolidation. The bones demonstrate

degenerative spondylosis changes.

 

Impression: No acute abdominal process

 

Cardiomegaly

 

Evidence of mild anasarca presumably related to the above, with bilateral moderate

size pleural effusions, trace free pelvic fluid, and edema of the subcutaneous fat

 

Multiple uterine calcifications, presumably old degenerated fibroids

 

Findings as noted, including degenerative spondylosis, old granulomatous

calcifications within the liver and spleen, right lower pole renal cysts

 

The CT scanner at [facility] is accredited by the [employer] College of

Radiology and the scans are performed using protocols designed to limit radiation

exposure to as low as reasonably achievable to attain images of sufficient resolution

adequate for diagnostic evaluation.

## 2019-07-02 VITALS — SYSTOLIC BLOOD PRESSURE: 146 MMHG | DIASTOLIC BLOOD PRESSURE: 100 MMHG

## 2019-07-02 VITALS — DIASTOLIC BLOOD PRESSURE: 90 MMHG | SYSTOLIC BLOOD PRESSURE: 142 MMHG

## 2019-07-02 VITALS — SYSTOLIC BLOOD PRESSURE: 148 MMHG | DIASTOLIC BLOOD PRESSURE: 95 MMHG

## 2019-07-02 VITALS — DIASTOLIC BLOOD PRESSURE: 94 MMHG | SYSTOLIC BLOOD PRESSURE: 144 MMHG

## 2019-07-02 VITALS — DIASTOLIC BLOOD PRESSURE: 89 MMHG | SYSTOLIC BLOOD PRESSURE: 133 MMHG

## 2019-07-02 VITALS — SYSTOLIC BLOOD PRESSURE: 141 MMHG | DIASTOLIC BLOOD PRESSURE: 83 MMHG

## 2019-07-02 LAB
ALBUMIN SERPL-MCNC: 3.3 G/DL (ref 3.4–5)
ALBUMIN/GLOB SERPL: 0.9 {RATIO} (ref 1–2.7)
ALP SERPL-CCNC: 142 U/L (ref 46–116)
ALT SERPL-CCNC: 369 U/L (ref 12–78)
ANION GAP SERPL CALC-SCNC: 15 MMOL/L (ref 5–15)
AST SERPL-CCNC: 237 U/L (ref 15–37)
BILIRUB DIRECT SERPL-MCNC: 0.4 MG/DL (ref 0–0.3)
BILIRUB SERPL-MCNC: 1.5 MG/DL (ref 0.2–1)
BUN SERPL-MCNC: 38 MG/DL (ref 7–18)
CALCIUM SERPL-MCNC: 9.2 MG/DL (ref 8.5–10.1)
CHLORIDE SERPL-SCNC: 108 MMOL/L (ref 98–107)
CK MB SERPL-MCNC: 2.5 NG/ML (ref 0–3.6)
CK SERPL-CCNC: 230 U/L (ref 26–308)
CO2 SERPL-SCNC: 20 MMOL/L (ref 21–32)
CREAT SERPL-MCNC: 2.3 MG/DL (ref 0.55–1.3)
GLOBULIN SER-MCNC: 3.6 G/DL
POTASSIUM SERPL-SCNC: 4.4 MMOL/L (ref 3.5–5.1)
SODIUM SERPL-SCNC: 143 MMOL/L (ref 136–145)

## 2019-07-02 RX ADMIN — METOPROLOL TARTRATE SCH MG: 25 TABLET, FILM COATED ORAL at 21:50

## 2019-07-02 RX ADMIN — METOPROLOL TARTRATE SCH MG: 25 TABLET, FILM COATED ORAL at 09:00

## 2019-07-02 RX ADMIN — DEXTROSE MONOHYDRATE SCH MLS/HR: 50 INJECTION, SOLUTION INTRAVENOUS at 14:13

## 2019-07-02 RX ADMIN — DEXTROSE MONOHYDRATE SCH MLS/HR: 50 INJECTION, SOLUTION INTRAVENOUS at 05:14

## 2019-07-02 NOTE — NUR
CASE MANAGEMENT:REVIEW





7/2/19

SI: PNEUMONIA. ELEVATED TROPONIN

97.3  62  19  149/95  99% ON RA

BUN+38   CR+2.3   TBILI+1.5  DBILI+0.4  AST/ALT+237/369

TROPONIN(+) 0.077



IS: IV ZOSYN Q8HRS

IVF@75/HR

LOPRESSOR PO Q12

PROTONIX PO QD

**: TELEMETRY STATUS

DCP: PATIENT IS FROM HOME

## 2019-07-02 NOTE — NUR
NURSE NOTES:

Received report from LOPEZ Stubbs. Patient sitting in bed awake showing no signs of acute 
distress. Respiration even and non labored on room air. No SOB noted. IV on right hand, 
patent and intact running on 0.45 NS @ 75cc/hr. Bed in lowest position, wheels locked and 
alarm on. Call light within reach. All needs attended and met. Will continue plan of care.

## 2019-07-02 NOTE — GENERAL PROGRESS NOTE
Assessment/Plan


Problem List:  


(1) Pneumonia


ICD Codes:  J18.9 - Pneumonia, unspecified organism


SNOMED:  448582599, 327445541


(2) Elevated transaminase level


ICD Codes:  R74.0 - Nonspecific elevation of levels of transaminase and lactic 

acid dehydrogenase [LDH]


SNOMED:  354920505, 051136927


(3) Elevated troponin


ICD Codes:  R74.8 - Abnormal levels of other serum enzymes


SNOMED:  815662146, 809390752, 056554367


(4) CHF (congestive heart failure), NYHA class IV


ICD Codes:  I50.9 - Heart failure, unspecified


SNOMED:  382381415, 365234047


(5) Pleural effusion


ICD Codes:  J90 - Pleural effusion, not elsewhere classified


SNOMED:  21025090


(6) Hepatitis


ICD Codes:  K75.9 - Inflammatory liver disease, unspecified


SNOMED:  846774737


Status:  stable, progressing


Assessment/Plan:


abx for pna


check hep panel. 


follow up bad us





Subjective


ROS Limited/Unobtainable:  No


Constitutional:  Reports: malaise, weakness


HEENT:  Reports: no symptoms


Cardiovascular:  Reports: no symptoms


Respiratory:  Reports: cough


Gastrointestinal/Abdominal:  Reports: no symptoms


Genitourinary:  Reports: no symptoms


Neurologic/Psychiatric:  Reports: no symptoms


Endocrine:  Reports: no symptoms


Hematologic/Lymphatic:  Reports: no symptoms


Allergies:  


Coded Allergies:  


     No Known Allergies (Unverified , 6/30/19)


All Systems:  reviewed and negative except above


Subjective


no new complaints. had short run vt yesterday. denies chest pain


Echo with ef 25-30%.


ct with adamaris pleural effusions.


LFTS remain elevated.





Objective





Last 24 Hour Vital Signs








  Date Time  Temp Pulse Resp B/P (MAP) Pulse Ox O2 Delivery O2 Flow Rate FiO2


 


7/2/19 12:00  70      


 


7/2/19 12:00 97.2 58 20 141/83 (102) 99   


 


7/2/19 09:00      Room Air  


 


7/2/19 09:00  62  133/89    


 


7/2/19 08:00  62      


 


7/2/19 08:00 97.6 62 20 133/89 (104) 97   


 


7/2/19 04:00 97.3 62 19 148/95 (112) 99   


 


7/2/19 04:00  55      


 


7/2/19 03:24  55      


 


7/2/19 00:00 98.2 59 19 142/90 (107) 97   


 


7/1/19 21:00      Room Air  


 


7/1/19 20:09  71  142/92    


 


7/1/19 20:00 98.2 71 19 142/92 (109) 97   


 


7/1/19 19:45  66      


 


7/1/19 19:45  66      


 


7/1/19 17:33  70  144/69    


 


7/1/19 17:33 98.1 70 20 144/69 (94) 97   


 


7/1/19 16:03    163/103    


 


7/1/19 16:00  150      


 


7/1/19 16:00 98.1 83 20 163/103 (123) 97   

















Intake and Output  


 


 7/1/19 7/2/19





 19:00 07:00


 


Intake Total 720 ml 300 ml


 


Balance 720 ml 300 ml


 


  


 


Intake Oral 720 ml 300 ml


 


# Voids  4


 


# Bowel Movements 1 1








Laboratory Tests


7/1/19 18:29: 


Magnesium Level 2.2, Troponin I 0.104H


7/2/19 06:28: 


Troponin I 0.077H, Sodium Level 143, Potassium Level 4.4, Chloride Level 108H, 

Carbon Dioxide Level 20L, Anion Gap 15, Blood Urea Nitrogen 38H, Creatinine 2.3H

, Estimat Glomerular Filtration Rate , Glucose Level 130H, Calcium Level 9.2, 

Total Bilirubin 1.5H, Direct Bilirubin 0.4H, Aspartate Amino Transf (AST/SGOT) 

237H, Alanine Aminotransferase (ALT/SGPT) 369H, Alkaline Phosphatase 142H, 

Total Creatine Kinase 230, Creatine Kinase MB 2.5, Creatine Kinase MB Relative 

Index 1.0, Total Protein 6.9, Albumin 3.3L, Globulin 3.6, Albumin/Globulin 

Ratio 0.9L


Height (Feet):  5


Height (Inches):  4.00


Weight (Pounds):  140


General Appearance:  WD/WN, alert


Neck:  supple


Cardiovascular:  normal rate, regular rhythm


Respiratory/Chest:  chest wall non-tender, normal breath sounds


Abdomen:  normal bowel sounds, non tender, soft, no organomegaly


Edema:  no edema noted Arm (L), no edema noted Arm (R), no edema noted Leg (L), 

no edema noted Leg (R), no edema noted Pedal (L), no edema noted Pedal (R), no 

edema noted Generalized











Marky Kohler MD 2, 2019 14:36

## 2019-07-02 NOTE — SURGERY PROGRESS NOTE
Surgery Progress Note


Subjective


Additional Comments


CT without acute abdominal process


US without stones 


lft's elevated


exam stable.





Objective





Last 24 Hour Vital Signs








  Date Time  Temp Pulse Resp B/P (MAP) Pulse Ox O2 Delivery O2 Flow Rate FiO2


 


7/2/19 09:00  62  133/89    


 


7/2/19 08:00 97.6 62 20 133/89 (104) 97   


 


7/2/19 04:00 97.3 62 19 148/95 (112) 99   


 


7/2/19 04:00  55      


 


7/2/19 03:24  55      


 


7/2/19 00:00 98.2 59 19 142/90 (107) 97   


 


7/1/19 21:00      Room Air  


 


7/1/19 20:09  71  142/92    


 


7/1/19 20:00 98.2 71 19 142/92 (109) 97   


 


7/1/19 19:45  66      


 


7/1/19 19:45  66      


 


7/1/19 17:33  70  144/69    


 


7/1/19 17:33 98.1 70 20 144/69 (94) 97   


 


7/1/19 16:03    163/103    


 


7/1/19 16:00  150      


 


7/1/19 16:00 98.1 83 20 163/103 (123) 97   








I&O











Intake and Output  


 


 7/1/19 7/2/19





 19:00 07:00


 


Intake Total 720 ml 300 ml


 


Balance 720 ml 300 ml


 


  


 


Intake Oral 720 ml 300 ml


 


# Voids  4


 


# Bowel Movements 1 1








Cardiovascular:  RSR


Respiratory:  clear


Abdomen:  soft, non-tender, present bowel sounds, non-distended


Extremities:  no edema, no tenderness, no cyanosis





Laboratory Tests








Test


  7/1/19


18:29 7/2/19


06:28


 


Magnesium Level


  2.2 MG/DL


(1.8-2.4) 


 


 


Troponin I


  0.104 ng/mL


(0.000-0.056) 0.077 ng/mL


(0.000-0.056)


 


Sodium Level


  


  143 MMOL/L


(136-145)


 


Potassium Level


  


  4.4 MMOL/L


(3.5-5.1)


 


Chloride Level


  


  108 MMOL/L


()  H


 


Carbon Dioxide Level


  


  20 MMOL/L


(21-32)  L


 


Anion Gap


  


  15 mmol/L


(5-15)


 


Blood Urea Nitrogen


  


  38 mg/dL


(7-18)  H


 


Creatinine


  


  2.3 MG/DL


(0.55-1.30)  H


 


Estimat Glomerular Filtration


Rate 


   mL/min (>60)  


 


 


Glucose Level


  


  130 MG/DL


()  H


 


Calcium Level


  


  9.2 MG/DL


(8.5-10.1)


 


Total Bilirubin


  


  1.5 MG/DL


(0.2-1.0)  H


 


Direct Bilirubin


  


  0.4 MG/DL


(0.0-0.3)  H


 


Aspartate Amino Transf


(AST/SGOT) 


  237 U/L


(15-37)  H


 


Alanine Aminotransferase


(ALT/SGPT) 


  369 U/L


(12-78)  H


 


Alkaline Phosphatase


  


  142 U/L


()  H


 


Total Creatine Kinase


  


  230 U/L


()


 


Creatine Kinase MB


  


  2.5 NG/ML


(0.0-3.6)


 


Creatine Kinase MB Relative


Index 


  1.0  


 


 


Total Protein


  


  6.9 G/DL


(6.4-8.2)


 


Albumin


  


  3.3 G/DL


(3.4-5.0)  L


 


Globulin  3.6 g/dL  


 


Albumin/Globulin Ratio


  


  0.9 (1.0-2.7)


L











Plan


Problems:  


(1) Pneumonia


(2) Elevated transaminase level


Assessment & Plan:  78F with elevated lft's


etiology unknown


diarrhea


pna


mild RUQ discomfort improved


labs noted and stable





no acute surgical intervention necessary 


US abd without acute process 


CT A/P without acute finding


trend labs


okay for diet


thank you


will follow with recs 





(3) Elevated troponin











Yonis Dickens Jul 2, 2019 12:20

## 2019-07-02 NOTE — NUR
NURSE NOTES:

Received report from Lissy/RN, Patient is asleep, Lying semi-caballero, resting comfortably. 
No acute distress/SOB noted at this time. Bed in lowest position and locked. Call light 
within reach. Will continue plan of care.

## 2019-07-02 NOTE — NUR
****INSURANCE



FAXED REVIEWS AND CLINICALS TO



[facility]

OZIEL MILLER

T: [phone] [id number]

F: [phone]

REF #[id number]

## 2019-07-02 NOTE — CARDIOLOGY REPORT
--------------- APPROVED REPORT --------------





EXAM: Two-dimensional and M-mode echocardiogram with Doppler and color 

Doppler.



INDICATION

Acute MI



M-Mode DIMENSIONS 

IVSd0.8 (0.7-1.1cm)Left Atrium (MM)5.4 (1.6-4.0cm)

LVDd4.8 (3.5-5.6cm)Aortic Root2.7 (2.0-3.7cm)

PWd0.9 (0.7-1.1cm)Aortic Cusp Exc.1.5 (1.5-2.0cm)



LVDs4.5 (2.5-4.0cm)

PWs1.1 cm





Normal left ventricular chamber size.

Severe global left ventricular hypokinesis. Abnormal anteroseptal motion. Inferoseptal 

dyskinesis. LV apical akinesis. Ischemic cardiomyopathy can not be excluded.

Left ventricular ejection fraction estimated to be 20-25 %.

No evidence of  left ventricular hypertrophy.

No evidence of pericardial effusion. Possible moderate pleural effusion.

Moderate bi-atrial enlargement.

Right ventricular chamber size is within normal limits.

Focal aortic valve sclerosis with adequate cusp excursion.

Thickened mitral valve leaflets with normal excursion.

Mitral annulus and aortic root calcification.

Pulmonic valve not well visualized.

Normal tricuspid valve structure. 

IVC dilated at 2.4 cm without physiologic collapse suggestive of increased RA 

pressure.



A  color flow and spectral Doppler study was performed and revealed:

Moderate to severe mitral regurgitation.

Mitral diastolic velocities suggest reduced left ventricular relaxation c/w  LV diastolic 

dysfunction (Grade I). 

Moderate to severe tricuspid regurgitation.

Tricuspid  systolic velocities suggests peak right ventricular systolic pressure of  78  

mmHg, consistent with severe pulmonary hypertension.

Pulmonic regurgitation present.

## 2019-07-03 VITALS — DIASTOLIC BLOOD PRESSURE: 60 MMHG | SYSTOLIC BLOOD PRESSURE: 136 MMHG

## 2019-07-03 VITALS — SYSTOLIC BLOOD PRESSURE: 138 MMHG | DIASTOLIC BLOOD PRESSURE: 75 MMHG

## 2019-07-03 VITALS — DIASTOLIC BLOOD PRESSURE: 68 MMHG | SYSTOLIC BLOOD PRESSURE: 110 MMHG

## 2019-07-03 VITALS — SYSTOLIC BLOOD PRESSURE: 135 MMHG | DIASTOLIC BLOOD PRESSURE: 72 MMHG

## 2019-07-03 VITALS — SYSTOLIC BLOOD PRESSURE: 127 MMHG | DIASTOLIC BLOOD PRESSURE: 67 MMHG

## 2019-07-03 VITALS — DIASTOLIC BLOOD PRESSURE: 86 MMHG | SYSTOLIC BLOOD PRESSURE: 153 MMHG

## 2019-07-03 LAB
ADD MANUAL DIFF: NO
ALBUMIN SERPL-MCNC: 3.6 G/DL (ref 3.4–5)
ALBUMIN/GLOB SERPL: 1.2 {RATIO} (ref 1–2.7)
ALP SERPL-CCNC: 168 U/L (ref 46–116)
ALT SERPL-CCNC: 390 U/L (ref 12–78)
ANION GAP SERPL CALC-SCNC: 14 MMOL/L (ref 5–15)
AST SERPL-CCNC: 196 U/L (ref 15–37)
BASOPHILS NFR BLD AUTO: 1 % (ref 0–2)
BILIRUB DIRECT SERPL-MCNC: 0.3 MG/DL (ref 0–0.3)
BILIRUB SERPL-MCNC: 1.2 MG/DL (ref 0.2–1)
BUN SERPL-MCNC: 43 MG/DL (ref 7–18)
CALCIUM SERPL-MCNC: 9.2 MG/DL (ref 8.5–10.1)
CHLORIDE SERPL-SCNC: 106 MMOL/L (ref 98–107)
CK SERPL-CCNC: 169 U/L (ref 26–308)
CO2 SERPL-SCNC: 24 MMOL/L (ref 21–32)
CREAT SERPL-MCNC: 2.4 MG/DL (ref 0.55–1.3)
EOSINOPHIL NFR BLD AUTO: 0.8 % (ref 0–3)
ERYTHROCYTE [DISTWIDTH] IN BLOOD BY AUTOMATED COUNT: 15.4 % (ref 11.6–14.8)
GLOBULIN SER-MCNC: 3 G/DL
HCT VFR BLD CALC: 36.5 % (ref 37–47)
HGB BLD-MCNC: 11.9 G/DL (ref 12–16)
LYMPHOCYTES NFR BLD AUTO: 25.8 % (ref 20–45)
MCV RBC AUTO: 96 FL (ref 80–99)
MONOCYTES NFR BLD AUTO: 12.2 % (ref 1–10)
NEUTROPHILS NFR BLD AUTO: 60.2 % (ref 45–75)
PLATELET # BLD: 184 K/UL (ref 150–450)
POTASSIUM SERPL-SCNC: 3.5 MMOL/L (ref 3.5–5.1)
RBC # BLD AUTO: 3.82 M/UL (ref 4.2–5.4)
SODIUM SERPL-SCNC: 144 MMOL/L (ref 136–145)
WBC # BLD AUTO: 7.1 K/UL (ref 4.8–10.8)

## 2019-07-03 RX ADMIN — METOPROLOL TARTRATE SCH MG: 25 TABLET, FILM COATED ORAL at 22:18

## 2019-07-03 RX ADMIN — DEXTROSE MONOHYDRATE SCH MLS/HR: 50 INJECTION, SOLUTION INTRAVENOUS at 13:49

## 2019-07-03 RX ADMIN — LISINOPRIL SCH MG: 20 TABLET ORAL at 18:30

## 2019-07-03 RX ADMIN — LISINOPRIL SCH MG: 20 TABLET ORAL at 08:43

## 2019-07-03 RX ADMIN — METOPROLOL TARTRATE SCH MG: 25 TABLET, FILM COATED ORAL at 08:44

## 2019-07-03 RX ADMIN — CARVEDILOL SCH MG: 6.25 TABLET, FILM COATED ORAL at 08:43

## 2019-07-03 RX ADMIN — DEXTROSE MONOHYDRATE SCH MLS/HR: 50 INJECTION, SOLUTION INTRAVENOUS at 01:55

## 2019-07-03 RX ADMIN — CARVEDILOL SCH MG: 6.25 TABLET, FILM COATED ORAL at 22:18

## 2019-07-03 NOTE — NUR
NURSE NOTES:

Received report from LOPEZ King. Patient is resting in bed in stable condition. Alert and 
Orientedx3. Breathing unlabored on room air. Bed in lowest position with two side rails up, 
brakes on, call light and bed side table within reach. Will continue plan of care.

## 2019-07-03 NOTE — GENERAL PROGRESS NOTE
Assessment/Plan


Problem List:  


(1) Pneumonia


ICD Codes:  J18.9 - Pneumonia, unspecified organism


SNOMED:  194124057, 860985720


(2) Elevated transaminase level


ICD Codes:  R74.0 - Nonspecific elevation of levels of transaminase and lactic 

acid dehydrogenase [LDH]


SNOMED:  025636902, 134507554


(3) Elevated troponin


ICD Codes:  R74.8 - Abnormal levels of other serum enzymes


SNOMED:  785582040, 219481281, 406906186


(4) CHF (congestive heart failure), NYHA class IV


ICD Codes:  I50.9 - Heart failure, unspecified


SNOMED:  555328278, 064132403


(5) Pleural effusion


ICD Codes:  J90 - Pleural effusion, not elsewhere classified


SNOMED:  73104941


(6) Hepatitis


ICD Codes:  K75.9 - Inflammatory liver disease, unspecified


SNOMED:  065781310


Status:  stable, progressing


Assessment/Plan:


abx for pna


check hep panel. 


follow up abd us


? dc planning





Subjective


ROS Limited/Unobtainable:  No


Constitutional:  Reports: malaise, weakness


HEENT:  Reports: no symptoms


Cardiovascular:  Reports: no symptoms


Respiratory:  Reports: no symptoms


Gastrointestinal/Abdominal:  Reports: no symptoms


Genitourinary:  Reports: no symptoms


Neurologic/Psychiatric:  Reports: no symptoms


Endocrine:  Reports: no symptoms


Hematologic/Lymphatic:  Reports: no symptoms


Allergies:  


Coded Allergies:  


     No Known Allergies (Unverified , 6/30/19)


All Systems:  reviewed and negative except above


Subjective


no new complaints.


overall feeds better. no cough. ct neg. LFTS still high.


d/w cards.





Objective





Last 24 Hour Vital Signs








  Date Time  Temp Pulse Resp B/P (MAP) Pulse Ox O2 Delivery O2 Flow Rate FiO2


 


7/3/19 08:44  63  153/86    


 


7/3/19 08:43  63  153/86    


 


7/3/19 08:43    153/86    


 


7/3/19 08:00 97.9 63 16 153/86 (108) 96   


 


7/3/19 04:00 98.2 89 20 110/68 (82) 98   


 


7/3/19 04:00  63      


 


7/3/19 00:00 98.0 61 20 135/72 (93) 94   


 


7/3/19 00:00  60      


 


7/2/19 21:50  64  146/100    


 


7/2/19 21:00      Room Air  


 


7/2/19 20:00 97.7 64 20 146/100 (115) 98   


 


7/2/19 20:00  61      


 


7/2/19 16:00 97.6 63 20 144/94 (111) 95   


 


7/2/19 16:00  66      


 


7/2/19 12:00  70      


 


7/2/19 12:00 97.2 58 20 141/83 (102) 99   

















Intake and Output  


 


 7/2/19 7/3/19





 18:59 06:59


 


Intake Total 120 ml 


 


Balance 120 ml 


 


  


 


Intake Oral 120 ml 


 


# Voids 3 3


 


# Bowel Movements 1 








Laboratory Tests


7/3/19 06:22: 


White Blood Count 7.1, Red Blood Count 3.82L, Hemoglobin 11.9L, Hematocrit 36.5L

, Mean Corpuscular Volume 96, Mean Corpuscular Hemoglobin 31.1H, Mean 

Corpuscular Hemoglobin Concent 32.6, Red Cell Distribution Width 15.4H, 

Platelet Count 184, Mean Platelet Volume 8.4, Neutrophils (%) (Auto) 60.2, 

Lymphocytes (%) (Auto) 25.8, Monocytes (%) (Auto) 12.2H, Eosinophils (%) (Auto) 

0.8, Basophils (%) (Auto) 1.0, Sodium Level 144, Potassium Level 3.5, Chloride 

Level 106, Carbon Dioxide Level 24, Anion Gap 14, Blood Urea Nitrogen 43H, 

Creatinine 2.4H, Estimat Glomerular Filtration Rate , Glucose Level 126H, 

Calcium Level 9.2, Magnesium Level 2.1, Total Bilirubin 1.2H, Direct Bilirubin 

0.3, Aspartate Amino Transf (AST/SGOT) 196H, Alanine Aminotransferase (ALT/SGPT

) 390H, Alkaline Phosphatase 168H, Total Creatine Kinase 169, Total Protein 6.6

, Albumin 3.6, Globulin 3.0, Albumin/Globulin Ratio 1.2, Anti-Nuclear Antibody 

Screen [Pending], [place] Virus Capsid Ag IgM Ab [Pending], [place] 

DNA Quant copies/mL [Pending], [place] Quant PCR Plasma log10 [Pending], 

Hepatitis A IgM Antibody [Pending], Herpes Simplex Virus I IgM Ab (IFA) [Pending

], Herpes Simplex Virus II IgM Ab (IFA [Pending]


Height (Feet):  5


Height (Inches):  4.00


Weight (Pounds):  139


General Appearance:  WD/WN, alert


Neck:  supple


Cardiovascular:  regularly irregular


Respiratory/Chest:  chest wall non-tender, lungs clear, normal breath sounds


Abdomen:  normal bowel sounds, non tender, soft, no organomegaly


Edema:  no edema noted Arm (L), no edema noted Arm (R), no edema noted Leg (L), 

no edema noted Leg (R), no edema noted Pedal (L), no edema noted Pedal (R), no 

edema noted Generalized











Marky Kohler MD Jul 3, 2019 09:06

## 2019-07-03 NOTE — DIAGNOSTIC IMAGING REPORT
Indication: Dyspnea

 

Comparison:  7/1/2019

 

A single view chest radiograph was obtained.

 

Findings:

 

Cardiomegaly is present. Pulmonary vascular congestion has improved since the last

occasion. There is a hazy opacity obscuring the right hemidiaphragm which may be

atelectasis or small pleural effusion.

 

IMPRESSION:

 

Improved pulmonary vascular congestion

## 2019-07-03 NOTE — GENERAL PROGRESS NOTE
Assessment/Plan


Status:  stable, progressing


Assessment/Plan:


Assessment


- slowly rising LFT, ? etiology


- imaging negative, ? infectious or inflammatory process


- respiratory infection





Recommendations


- check HAV Ig M  (Ig total positive)


- Check CMV  PCR


- check EBV IgM


- Check HSV IgM


- Check CPK - normal


- Check BONNIE


- avoid hepatotoxic meds





Subjective


Allergies:  


Coded Allergies:  


     No Known Allergies (Unverified , 6/30/19)


Subjective


Feels OK


no abd pain


labs d/w patient





Objective





Last 24 Hour Vital Signs








  Date Time  Temp Pulse Resp B/P (MAP) Pulse Ox O2 Delivery O2 Flow Rate FiO2


 


7/3/19 12:00 98.1 52 16 127/67 (87) 98   


 


7/3/19 12:00  53      


 


7/3/19 09:00      Room Air  


 


7/3/19 08:44  63  153/86    


 


7/3/19 08:43  63  153/86    


 


7/3/19 08:43    153/86    


 


7/3/19 08:00 97.9 63 16 153/86 (108) 96   


 


7/3/19 08:00  64      


 


7/3/19 04:00 98.2 89 20 110/68 (82) 98   


 


7/3/19 04:00  63      


 


7/3/19 00:00 98.0 61 20 135/72 (93) 94   


 


7/3/19 00:00  60      


 


7/2/19 21:50  64  146/100    


 


7/2/19 21:00      Room Air  


 


7/2/19 20:00 97.7 64 20 146/100 (115) 98   


 


7/2/19 20:00  61      


 


7/2/19 16:00 97.6 63 20 144/94 (111) 95   


 


7/2/19 16:00  66      

















Intake and Output  


 


 7/2/19 7/3/19





 19:00 07:00


 


Intake Total 120 ml 


 


Balance 120 ml 


 


  


 


Intake Oral 120 ml 


 


# Voids 3 3


 


# Bowel Movements 1 








Laboratory Tests


7/3/19 06:22: 


White Blood Count 7.1, Red Blood Count 3.82L, Hemoglobin 11.9L, Hematocrit 36.5L

, Mean Corpuscular Volume 96, Mean Corpuscular Hemoglobin 31.1H, Mean 

Corpuscular Hemoglobin Concent 32.6, Red Cell Distribution Width 15.4H, 

Platelet Count 184, Mean Platelet Volume 8.4, Neutrophils (%) (Auto) 60.2, 

Lymphocytes (%) (Auto) 25.8, Monocytes (%) (Auto) 12.2H, Eosinophils (%) (Auto) 

0.8, Basophils (%) (Auto) 1.0, Sodium Level 144, Potassium Level 3.5, Chloride 

Level 106, Carbon Dioxide Level 24, Anion Gap 14, Blood Urea Nitrogen 43H, 

Creatinine 2.4H, Estimat Glomerular Filtration Rate , Glucose Level 126H, 

Calcium Level 9.2, Magnesium Level 2.1, Total Bilirubin 1.2H, Direct Bilirubin 

0.3, Aspartate Amino Transf (AST/SGOT) 196H, Alanine Aminotransferase (ALT/SGPT

) 390H, Alkaline Phosphatase 168H, Total Creatine Kinase 169, Total Protein 6.6

, Albumin 3.6, Globulin 3.0, Albumin/Globulin Ratio 1.2, Anti-Nuclear Antibody 

Screen [Pending], [place] Virus Capsid Ag IgM Ab [Pending], [place] 

DNA Quant copies/mL [Pending], [place] Quant PCR Plasma log10 [Pending], 

Hepatitis A IgM Antibody [Pending], Herpes Simplex Virus I IgM Ab (IFA) [Pending

], Herpes Simplex Virus II IgM Ab (IFA [Pending]


Height (Feet):  5


Height (Inches):  4.00


Weight (Pounds):  139


Objective


WDWN AA woman


NCAT


supple


CTA


RR


abd soft NT ND


no edema


non focal











Ricardo Taylor MD Jul 3, 2019 15:43

## 2019-07-03 NOTE — CDS PHYSICIAN QUERY
Clarification is required for compliance, coding accuracy, and to reflect 
severity of illness for this patient



Dear Dr. Marky Kohler   Date: 7/3/2019



/CDS Name: Ashley Pereira   



7/3 Documentation states: CHF (congestive heart failure), NYHA class IV



Echo: Severe global left ventricular hypokinesis. Abnormal anteroseptal motion. 
Inferoseptal dyskinesis. LV apical akinesis. Ischemic cardiomyopathy can not be 
excluded. Left ventricular ejection fraction estimated to be 20-25 %.



Treatment: IV lasix



Please Clarify:



Acuity

   

[] Acute   

[x] Chronic   

[] Acute on Chronic



Type  

      

[] Systolic

[x] Diastolic         

[] Systolic & Diastolic (Combined)

[] Other: _____________________________







Present on Admission:  [x]  Yes          []  No         []  Clinically 
Undetermined





_________________                                    _____________

Physician signature                                       Date



Please also document in your Progress Notes and/or Discharge Summary and 
indicate if the condition was present on admission.

[place]

## 2019-07-03 NOTE — NUR
NURSE NOTES:

Received report from LOPEZ Salgado. Patient sitting in bed awake showing no signs of acute 
distress. Respiration even and non labored on room air. No SOB noted. IV on left FA SL. Bed 
in lowest position, wheels locked and alarm on. Call light within reach. All needs attended 
and met. Will continue plan of care.

## 2019-07-03 NOTE — CONSULTATION
DATE OF CONSULTATION:  07/02/2019

NOTE:  "POOR AUDIO QUALITY"



GASTROENTEROLOGY CONSULTATION



CONSULTING PHYSICIAN:  Ricardo Taylor M.D.



REFERRING PHYSICIAN:  Marky Kohler M.D.



CHIEF COMPLAINT:  I was asked to see this patient by Dr. Marky Kohler for

evaluation of abnormal liver tests.



HISTORY OF PRESENT ILLNESS:  The patient is a pleasant 78-year-old 

American woman who is here visiting from another town, who comes in with a

3-day history of abdominal pain, diarrhea, and also respiratory infection.

She has been seen by multiple consultants and has been placed on

antibiotics for respiratory infection.  Her liver tests appeared to be

widening, which prompted this consultation.  The patient denies any

abdominal pain and has had no previous liver disease history.  The patient

does not drink alcohol and has no fatty food intolerance.  No history of

gallstones.  She has had imaging studies of CT scan as well as an

abdominal ultrasound, which were unrevealing.  _____ hepatitis serologies

have been ordered.



PAST MEDICAL HISTORY:  As per above discussion.



FAMILY HISTORY:  Negative for gastrointestinal disorder or hepatic

disorder.



SOCIAL HISTORY:  The patient does not live in [place].  She is here

visiting.  She does not drink or smoke.



REVIEW OF SYSTEMS:  Otherwise negative.



MEDICATIONS:  See the chart list for details.



PHYSICAL EXAMINATION:

GENERAL:  Pleasant  woman, seen in her room.

HEENT:  Normocephalic and atraumatic.  Sclerae anicteric.  Oropharynx

clear.

NECK:  Supple.

CHEST:  Clear to auscultation.

CARDIOVASCULAR:  Revealed regular rate.

ABDOMEN:  Soft.  Good bowel sounds.  There is no organomegaly.

EXTREMITIES:  Revealed no edema.



LABORATORY DATA:  Noted.



ASSESSMENT:  This patient presents with abnormal liver tests of unclear

etiology.  The patient's hepatitis serologies should be followed.  In

addition, given her recent illness, the patient's CMV and [place]-Barr

virus titers should be checked.  Herpes simplex should be also checked

although it is less likely in this scenario.  Autoimmune hepatitis should

be another consideration and I will screen her with some autoimmune

markers.  The liver tests will be followed _____.



RECOMMENDATIONS:  Per above discussion and per orders written in the

chart.



Thank you for asking me to participate in the care of this patient.









  ______________________________________________

  Ricardo Taylor M.D.





DR:  RELL

D:  07/02/2019 22:23

T:  07/03/2019 00:25

JOB#:  [id number]

CC:

## 2019-07-03 NOTE — CARDIOLOGY REPORT
--------------- APPROVED REPORT --------------





EKG Measurement

Heart [id number]

OR 112P19

[id number]

[id number]

[id number]





Sinus bradycardia with premature atrial complexes

Left bundle branch block

Abnormal ECG

## 2019-07-03 NOTE — SURGERY PROGRESS NOTE
Surgery Progress Note


Subjective


Symptoms:  improved, tolerating diet, passing flatus, BM





Objective





Last 24 Hour Vital Signs








  Date Time  Temp Pulse Resp B/P (MAP) Pulse Ox O2 Delivery O2 Flow Rate FiO2


 


7/3/19 12:00 98.1 52 16 127/67 (87) 98   


 


7/3/19 09:00      Room Air  


 


7/3/19 08:44  63  153/86    


 


7/3/19 08:43  63  153/86    


 


7/3/19 08:43    153/86    


 


7/3/19 08:00 97.9 63 16 153/86 (108) 96   


 


7/3/19 08:00  64      


 


7/3/19 04:00 98.2 89 20 110/68 (82) 98   


 


7/3/19 04:00  63      


 


7/3/19 00:00 98.0 61 20 135/72 (93) 94   


 


7/3/19 00:00  60      


 


7/2/19 21:50  64  146/100    


 


7/2/19 21:00      Room Air  


 


7/2/19 20:00 97.7 64 20 146/100 (115) 98   


 


7/2/19 20:00  61      


 


7/2/19 16:00 97.6 63 20 144/94 (111) 95   


 


7/2/19 16:00  66      








I&O











Intake and Output  


 


 7/2/19 7/3/19





 19:00 07:00


 


Intake Total 120 ml 


 


Balance 120 ml 


 


  


 


Intake Oral 120 ml 


 


# Voids 3 3


 


# Bowel Movements 1 








Cardiovascular:  RSR


Respiratory:  clear


Abdomen:  soft, non-tender, present bowel sounds, non-distended





Laboratory Tests








Test


  7/3/19


06:22


 


White Blood Count


  7.1 K/UL


(4.8-10.8)


 


Red Blood Count


  3.82 M/UL


(4.20-5.40)  L


 


Hemoglobin


  11.9 G/DL


(12.0-16.0)  L


 


Hematocrit


  36.5 %


(37.0-47.0)  L


 


Mean Corpuscular Volume 96 FL (80-99)  


 


Mean Corpuscular Hemoglobin


  31.1 PG


(27.0-31.0)  H


 


Mean Corpuscular Hemoglobin


Concent 32.6 G/DL


(32.0-36.0)


 


Red Cell Distribution Width


  15.4 %


(11.6-14.8)  H


 


Platelet Count


  184 K/UL


(150-450)


 


Mean Platelet Volume


  8.4 FL


(6.5-10.1)


 


Neutrophils (%) (Auto)


  60.2 %


(45.0-75.0)


 


Lymphocytes (%) (Auto)


  25.8 %


(20.0-45.0)


 


Monocytes (%) (Auto)


  12.2 %


(1.0-10.0)  H


 


Eosinophils (%) (Auto)


  0.8 %


(0.0-3.0)


 


Basophils (%) (Auto)


  1.0 %


(0.0-2.0)


 


Sodium Level


  144 MMOL/L


(136-145)


 


Potassium Level


  3.5 MMOL/L


(3.5-5.1)


 


Chloride Level


  106 MMOL/L


()


 


Carbon Dioxide Level


  24 MMOL/L


(21-32)


 


Anion Gap


  14 mmol/L


(5-15)


 


Blood Urea Nitrogen


  43 mg/dL


(7-18)  H


 


Creatinine


  2.4 MG/DL


(0.55-1.30)  H


 


Estimat Glomerular Filtration


Rate  mL/min (>60)  


 


 


Glucose Level


  126 MG/DL


()  H


 


Calcium Level


  9.2 MG/DL


(8.5-10.1)


 


Magnesium Level


  2.1 MG/DL


(1.8-2.4)


 


Total Bilirubin


  1.2 MG/DL


(0.2-1.0)  H


 


Direct Bilirubin


  0.3 MG/DL


(0.0-0.3)


 


Aspartate Amino Transf


(AST/SGOT) 196 U/L


(15-37)  H


 


Alanine Aminotransferase


(ALT/SGPT) 390 U/L


(12-78)  H


 


Alkaline Phosphatase


  168 U/L


()  H


 


Total Creatine Kinase


  169 U/L


()


 


Total Protein


  6.6 G/DL


(6.4-8.2)


 


Albumin


  3.6 G/DL


(3.4-5.0)


 


Globulin 3.0 g/dL  


 


Albumin/Globulin Ratio 1.2 (1.0-2.7)  


 


Anti-Nuclear Antibody Screen Pending  


 


[place] Virus Capsid Ag


IgM Ab Pending  


 


 


[place] DNA Quant


copies/mL Pending  


 


 


[place] Quant PCR Plasma


log10 Pending  


 


 


Hepatitis A IgM Antibody Pending  


 


Herpes Simplex Virus I IgM Ab


(IFA) Pending  


 


 


Herpes Simplex Virus II IgM


Ab (IFA Pending  


 











Plan


Problems:  


(1) Pneumonia


(2) Elevated transaminase level


Assessment & Plan:  78F with elevated lft's


etiology unknown


diarrhea


pna


mild RUQ discomfort improved


labs noted and stable





no acute surgical intervention necessary 


US abd without acute process 


CT A/P without acute finding


trend labs


okay for diet





pending labs as per GI order





thank you


will follow with recs 





(3) Elevated troponin











Yonis Dickens Jul 3, 2019 12:55

## 2019-07-03 NOTE — NUR
CASE MANAGEMENT:REVIEW



7/3/19

SI: PNA. CHF. HEPATITIS. PLEURAL EFFUSION 

ELEVATED TROPONIN AND ENZYMES

97.9   63  16  153/86  96% ON RA

BUN+43  CR+2.4



IS: IV ZOSYN Q12

IV LASIX QD

LISINOPRIL PO BID

COREG PO Q12

LOPRESSOR PO Q12

PROTONIX PO QD

**: TELEMETRY STATUS

DCP: FROM HOME





PLAN:

CHEST XRAY FOR TODAY ~ IF IMPROVED THEN WILL DISCHARGE

## 2019-07-04 VITALS — SYSTOLIC BLOOD PRESSURE: 120 MMHG | DIASTOLIC BLOOD PRESSURE: 70 MMHG

## 2019-07-04 VITALS — SYSTOLIC BLOOD PRESSURE: 133 MMHG | DIASTOLIC BLOOD PRESSURE: 62 MMHG

## 2019-07-04 VITALS — DIASTOLIC BLOOD PRESSURE: 73 MMHG | SYSTOLIC BLOOD PRESSURE: 138 MMHG

## 2019-07-04 VITALS — SYSTOLIC BLOOD PRESSURE: 122 MMHG | DIASTOLIC BLOOD PRESSURE: 61 MMHG

## 2019-07-04 LAB
ALBUMIN SERPL-MCNC: 3.3 G/DL (ref 3.4–5)
ALBUMIN/GLOB SERPL: 1.1 {RATIO} (ref 1–2.7)
ALP SERPL-CCNC: 142 U/L (ref 46–116)
ALT SERPL-CCNC: 283 U/L (ref 12–78)
ANION GAP SERPL CALC-SCNC: 13 MMOL/L (ref 5–15)
AST SERPL-CCNC: 109 U/L (ref 15–37)
BILIRUB SERPL-MCNC: 0.8 MG/DL (ref 0.2–1)
BUN SERPL-MCNC: 45 MG/DL (ref 7–18)
CALCIUM SERPL-MCNC: 8.8 MG/DL (ref 8.5–10.1)
CHLORIDE SERPL-SCNC: 106 MMOL/L (ref 98–107)
CO2 SERPL-SCNC: 26 MMOL/L (ref 21–32)
CREAT SERPL-MCNC: 2.4 MG/DL (ref 0.55–1.3)
GLOBULIN SER-MCNC: 3 G/DL
POTASSIUM SERPL-SCNC: 2.9 MMOL/L (ref 3.5–5.1)
SODIUM SERPL-SCNC: 145 MMOL/L (ref 136–145)

## 2019-07-04 RX ADMIN — DEXTROSE MONOHYDRATE SCH MLS/HR: 50 INJECTION, SOLUTION INTRAVENOUS at 02:03

## 2019-07-04 RX ADMIN — LISINOPRIL SCH MG: 20 TABLET ORAL at 09:19

## 2019-07-04 NOTE — NUR
****INSURANCE



FAXED REVIEWS TO



[facility]

T: [phone] [id number]

F: [phone]

REF #[id number]

## 2019-07-04 NOTE — CARDIOLOGY REPORT
--------------- APPROVED REPORT --------------





EKG Measurement

Heart [id number]

TN 152P

[id number]

[id number]

[id number]





Sinus rhythm with sinus arrhythmia with premature ventricular complexes or fusion 

complexes

Left axis deviation

Left bundle branch block

Abnormal ECG

## 2019-07-04 NOTE — DISCHARGE SUMMARY
DATE OF ADMISSION:  06/30/2019



DATE OF DISCHARGE:  07/04/2019



ADMISSION DIAGNOSES:

1. Abdominal pain.

2. Cough.

3. Possible pneumonia.

4. Elevated liver function tests.

5. Elevated troponin.



DISCHARGE DIAGNOSES:

1. Abdominal pain.

2. Cough.

3. Possible pneumonia.

4. Elevated liver function tests.

5. Elevated troponin.

6. CHF.

7. Non-ST-elevation MI.



HOSPITAL COURSE:  The patient is a pleasant female who presented with

complaints of abdominal pain and cough.  She had a CAT scan of the abdomen

that was relatively unremarkable, her x-ray though showed evidence of

possible pneumonia.  She also had elevated creatinine and elevated liver

function tests.  She was admitted and received intravenous antibiotics.

Her symptoms resolved quickly.  She was seen by Cardiology.  She was found

to likely have ischemic cardiomyopathy and low EF, and the elevated liver

function tests were thought to be due to passive congestion.  GI

consultation was obtained.  On discharge, the patient was stabilized to be

discharged on oral diuretic therapy.  She has been asked to follow up with

her PMD in one week.  She has been asked to return if she has any

worsening fevers, chills, chest pain, or shortness of breath.



DISCHARGE MEDICATIONS:  Please see discharge medication list for discharge

medications.



DIET:  Cardiac diet.



ACTIVITIES:  Ad-krishna.









  ______________________________________________

  Marky Kohler M.D.





DR:  ALONSO

D:  07/04/2019 10:00

T:  07/04/2019 16:40

JOB#:  [id number]

CC:

## 2019-07-04 NOTE — PROGRESS NOTE
DATE:  07/03/2019

CARDIOLOGY PROGRESS NOTE



SUBJECTIVE:  The patient has less shortness of breath.  No chest pain.  She

was started on IV diuretics yesterday.  Intake and output are not accurate

____ she is incontinent.



OBJECTIVE:

VITAL SIGNS:  Blood pressure 153/86, pulse 63, respirations 16.

LUNGS:  Diminished breath sounds.  Few rales.

HEART:  Regular rhythm and rate.  Normal S1 and S2.  A 2/6 systolic apical

murmur.

ABDOMEN:  Soft.

EXTREMITIES:  With 1+ edema.



LABORATORY AND DIAGNOSTIC DATA:  White count 7.1 and hemoglobin 11.9.

Potassium 3.5, magnesium 2.1, BUN 43, and creatinine 2.4.  Liver function

studies remain elevated.  Chest x-ray today reveals improved vascular

congestion.



IMPRESSION:

1. Acute on chronic systolic congestive heart failure.

2. Acute on chronic renal failure.

3. Nonsustained ventricular tachycardia.

4. Hypertensive cardiomyopathy.

5. Troponin leak due to hypoperfusion.

6. Sinus bradycardia due to beta-blocker therapy.



PLAN:

1. Continue diuresis and titration of anti-failure drugs.

2. Discharge planning.

3. Decrease beta-blocker dose.









  ______________________________________________

  Maco Fernandez M.D. DR:  MIREYA

D:  07/04/2019 02:09

T:  07/04/2019 04:29

JOB#:  [id number]

CC:

## 2019-07-04 NOTE — SURGERY PROGRESS NOTE
Surgery Progress Note


Subjective


Additional Comments


no acute events.  stable.  comfortable. 


exam unchanged


labs okay


cxr improved





Objective





Last 24 Hour Vital Signs








  Date Time  Temp Pulse Resp B/P (MAP) Pulse Ox O2 Delivery O2 Flow Rate FiO2


 


7/4/19 09:19  59  138/73    


 


7/4/19 09:19    138/73    


 


7/4/19 09:00      Room Air  


 


7/4/19 08:00 97.5 59 20 138/73 (94) 95   


 


7/4/19 04:00 98.0 54 20 133/62 (85) 95   


 


7/4/19 04:00  53      


 


7/4/19 00:00  46      


 


7/4/19 00:00 97.9 50 20 120/70 (87) 97   


 


7/3/19 22:18  56  138/75    


 


7/3/19 22:18  56  138/75    


 


7/3/19 21:00      Room Air  


 


7/3/19 20:00 97.8 56 20 138/75 (96) 96   


 


7/3/19 20:00  54      


 


7/3/19 18:30    136/60    


 


7/3/19 16:00  45      


 


7/3/19 16:00 98.3 52 16 136/60 (85) 98   








I&O











Intake and Output  


 


 7/3/19 7/4/19





 19:00 07:00


 


Intake Total 1200 ml 480 ml


 


Balance 1200 ml 480 ml


 


  


 


Intake Oral 1200 ml 480 ml


 


# Voids 5 3


 


# Bowel Movements 1 








Dressing:  dry


Wound:  clean


Cardiovascular:  RSR


Respiratory:  clear


Abdomen:  soft, non-tender, present bowel sounds, non-distended


Extremities:  no cyanosis





Laboratory Tests








Test


  7/4/19


05:35


 


Sodium Level


  145 MMOL/L


(136-145)


 


Potassium Level


  2.9 MMOL/L


(3.5-5.1)  L


 


Chloride Level


  106 MMOL/L


()


 


Carbon Dioxide Level


  26 MMOL/L


(21-32)


 


Anion Gap


  13 mmol/L


(5-15)


 


Blood Urea Nitrogen


  45 mg/dL


(7-18)  H


 


Creatinine


  2.4 MG/DL


(0.55-1.30)  H


 


Estimat Glomerular Filtration


Rate  mL/min (>60)  


 


 


Glucose Level


  179 MG/DL


()  H


 


Calcium Level


  8.8 MG/DL


(8.5-10.1)


 


Total Bilirubin


  0.8 MG/DL


(0.2-1.0)


 


Aspartate Amino Transf


(AST/SGOT) 109 U/L


(15-37)  H


 


Alanine Aminotransferase


(ALT/SGPT) 283 U/L


(12-78)  H


 


Alkaline Phosphatase


  142 U/L


()  H


 


Pro-B-Type Natriuretic Peptide


  67287 pg/mL


(0-125)  H


 


Total Protein


  6.3 G/DL


(6.4-8.2)  L


 


Albumin


  3.3 G/DL


(3.4-5.0)  L


 


Globulin 3.0 g/dL  


 


Albumin/Globulin Ratio 1.1 (1.0-2.7)  











Plan


Problems:  


(1) Pneumonia


(2) Elevated transaminase level


Assessment & Plan:  78F with elevated lft's


etiology unknown


diarrhea


pna


mild RUQ discomfort improved


labs noted and stable





no acute surgical intervention necessary 


US abd without acute process 


CT A/P without acute finding


trend labs


okay for diet





thank you


will follow with recs 





(3) Elevated troponin











Yonis Dickens Jul 4, 2019 12:09

## 2019-07-04 NOTE — NUR
CASE MANAGEMENT:REVIEW



7/4/19



SI: PNA. CHF. HEPATITIS. PLEURAL EFFUSION 

ELEVATED TROPONIN AND ENZYMES

T 97.5 HR 59 RR 20 B/P 138/73 SATS 95% ON RA 

K 2.9 BUN 45 CR 2.4     BNP [id number]



IS: IV ZOSYN Q12H

IV LASIX QD

LISINOPRIL PO BID

COREG PO Q12H

LOPRESSOR PO Q12H

PROTONIX PO QD



**: TELEMETRY STATUS



DCP: FROM HOME





PLAN:

CXR 7/3>> IMPRESSION:Improved pulmonary vascular congestion

## 2019-07-04 NOTE — NUR
NURSE NOTES:

Report received from John MARROQUIN. Pt asleep in bed but able to respond to verbal tactile. No 
c/a pain. No acute distress noted. IV in LFA 22G SL patent and asymptomatic. Bed in its 
lowest position and locked. Rhythm with SR with BBB reported during previous shift and Dr. oliveros aware. Will continue to plan of care.

## 2019-07-04 NOTE — NUR
Discharge:



Patient is being discharged from medical care.  Awake, alert and oriented x3.  After care 
instructions, including referral to community resources were given.  Patient and son 
verbalized understanding of After care instructions; at this time patient does not request 
medications, equipment or placement.  Patient signed patient consent in the medical record 
for patient destination upon discharge.  All medical devices such as cardiac monitor, IV and 
ID band were removed.  Patient ambulated out with all personal belongings with steady gait 
with her son.

## 2019-07-05 NOTE — PROGRESS NOTE
DATE:  07/01/2019

CARDIOLOGY PROGRESS NOTE



SUBJECTIVE:  The patient is confused.  According to her son, she does have

baseline dementia.  Abdominal pain has recovered and she is tolerating

food.  She denies chest pain.  She is short of breath apparently with

activity.



OBJECTIVE:

VITAL SIGNS:  Blood pressure 150/90, heart rate 91, respiratory rate 20,

and afebrile.

LUNGS:  Diminished breath sounds with few rales.

CARDIAC:  Regular rhythm and rate.  Normal S1, paradoxically split S2 with

a 1/6 systolic apical murmur.

ABDOMEN:  Soft.

EXTREMITIES:  1+ dependent edema.



LABORATORY DATA:  White count 6.9, hemoglobin 11.2.  Potassium 4.1, BUN 28,

creatinine 1.7.  Troponin 0.104.



IMPRESSION:

1. Acute myocardial ischemia and possible non-ST-elevation infarction,

resolved.

2. Lactic acidosis.

3. Acute on chronic systolic congestive heart failure.

4. Hypertensive heart disease.

5. Chronic kidney disease with possible acute component.

6. Transaminitis, maybe due to acute hepatobiliary process.

7. Nonsustained ventricular ectopy.



PLAN:

1. Await echocardiogram.

2. Anti-platelet therapy.

3. Beta-blocker if heart rate can tolerate.

4. Continue cardiac monitoring.









  ______________________________________________

  ROMAINE Mcgee

D:  07/05/2019 02:28

T:  07/05/2019 02:33

JOB#:  [id number]

CC:

## 2019-07-05 NOTE — PROGRESS NOTE
DATE:  07/02/2019

CARDIOLOGY PROGRESS NOTE



SUBJECTIVE:  The patient seen and evaluated with her son at bedside.  I

also spoke with her other son who lives near her in [place] and is

involved with her care there.



The patient's son is not aware of her specific medical condition nor her

medications.  He states that she has not been told that she had any heart

problems.  I made them aware of her current status with abnormal

echocardiogram and acute on chronic heart failure.



OBJECTIVE:

VITAL SIGNS:  Blood pressure 144/94, pulse 63, respiratory rate 20,

afebrile.

LUNGS:  Few rales.

NECK:  Jugular venous pressure elevated.

HEART:  Regular rhythm and rate.  Normal S1 and S2 with a 1/6 holosystolic

apical murmur.

ABDOMEN:  Soft and nontender.  No edema.



LABORATORY DATA:  Potassium 4.4, BUN 38, and creatinine 2.3.  Troponin has

decreased to 0.077.  CK-MB is negative ____01:32_ index.  Albumin 3.3.



IMPRESSION:

1. Acute on chronic systolic and diastolic congestive heart failure.

2. Passive congestion of the liver.

3. Dilated cardiomyopathy.

4. Hypertensive heart disease with labile blood pressure.

5. Troponin leak likely due to high output state in the setting of

systolic dysfunction.

6. Nonsustained ventricular tachycardia.



PLAN:

1. Diuresis.

2. Advance anti-failure and antihypertensive regimen.

3. Continue cardiac monitoring.









  ______________________________________________

  Maco Fernandez M.D.





DR:  FRIDA

D:  07/03/2019 02:24

T:  07/03/2019 05:10

JOB#:  [id number]

CC:



[facility]

## 2019-07-05 NOTE — CONSULTATION
DATE OF CONSULTATION: June 30, 2019

CARDIOLOGY CONSULTATION



CONSULTING PHYSICIAN:  Maco Fernandez M.D.



REQUESTING PHYSICIAN:  Marky Kohler M.D.



REASON FOR CONSULTATION:  Elevated troponin level.



HISTORY OF PRESENT ILLNESS:  This 78-year-old female who is visiting her

son from [place], [place], presented to the hospital with several

days of abdominal pain, nonproductive cough, and some shortness of breath.

She denied chest pain.  She was noted to have abnormal laboratory studies

as well as an abnormal chest radiograph.  I have been asked to assist with

cardiovascular management and address her elevated troponin level.



PAST MEDICAL HISTORY:  Hypertension and cerebrovascular disease with

dementia.



ALLERGIES:  None.



MEDICATIONS:  Not known.



SOCIAL HISTORY:  Social alcohol.  No smoking or substance abuse.



REVIEW OF SYSTEMS:  Cannot be reliably obtained from the patient, although

according to her son, she has had some diarrhea and abdominal pain over

the past few days.



PHYSICAL EXAMINATION:

VITAL SIGNS:  Afebrile, blood pressure 140/73, pulse 78, and respiratory

rate 20.

NECK:  Jugular venous pressure is slightly elevated.

LUNGS:  With few rales.

CARDIAC:  Regular rhythm and rate.  Normal S1 and paradoxically split S2.

A 1/6 systolic apical murmur.

ABDOMEN:  Soft.  Mild tenderness in the right upper quadrant.

EXTREMITIES:  With 1+ dependent edema.



LABORATORY DATA:  Troponin 0.069.  EKG, sinus rhythm and left bundle branch

block with ventricular ectopics.



IMPRESSION:

1. Acute myocardial ischemia.

2. Possible cardiomyopathy.

3. Ventricular ectopy.

4. Conduction system disease.

5. Transaminitis and possible acute hepatobiliary syndrome.



PLAN:

1. Cardiac monitoring.

2. Serial troponins.

3. Antiplatelet therapy.

4. Consider beta-blocker.

5. Further GI imaging.

6. Echocardiogram.

7. Reassess for further cardiovascular interventions following review of

echocardiogram.









  ______________________________________________

  Maco Fernandez M.D.





DR:  ARSH

D:  07/05/2019 02:13

T:  07/05/2019 04:48

JOB#:  [id number]

CC:



[facility]

## 2019-07-05 NOTE — PROGRESS NOTE
DATE:  07/04/2019

CARDIOLOGY PROGRESS NOTE



SUBJECTIVE:  The patient's shortness of breath has improved significantly.

Monitored rhythm sinus with ventricular ectopy, nonsustained.



OBJECTIVE:

VITAL SIGNS:  Blood pressure 138/73, pulse 59, and respirations 20.

LUNGS:  Clear breath sounds.

CARDIAC:  Regular rhythm and rate.  Normal S1, paradoxically split S2.  1/6

systolic apical murmur.

ABDOMEN:  Soft.

EXTREMITIES:  With trace edema.



LABORATORY DATA:  Potassium 2.9.  BUN 45, creatinine 2.4, pro-natriuretic

peptide 16,000.



IMPRESSION:

1. Troponin leak due to high demand state in the setting of acute heart

failure and uncontrolled blood pressure.

2. Acute on chronic systolic congestive heart failure.

3. Dilated cardiomyopathy, likely chronic.

4. Acute on chronic renal failure.

5. Hypokalemia.

6. Nonsustained ventricular ectopy.



PLAN:

1. Add Aldactone.

2. Transition from IV to oral diuretics.

3. Low-dose beta-blocker as tolerated by heart rate.

4. Hydrate.

5. ACE inhibitor with close monitoring of renal parameters.

6. Outpatient followup.

7. Salt restriction was discussed.









  ______________________________________________

  Maco Fernandez M.D. DR:  CARLOS ALBERTO

D:  07/05/2019 01:50

T:  07/05/2019 02:20

JOB#:  [id number]

CC:  Marky Kohler M.D.

## 2019-07-05 NOTE — PROGRESS NOTE
DATE:  07/02/2019  (2nd visit)

CARDIOLOGY PROGRESS NOTE



Late entry for July 2, 2019.



SUBJECTIVE:  The patient was seen, son at bedside.  Echocardiogram findings

were discussed.  In addition, the patient's son _____ was contacted to

discuss prior medical care, which she was not aware of any nor was there

any known history of cardiac disease.



OBJECTIVE:

VITAL SIGNS:  Blood pressure 141/83, pulse 58, respirations 20, heart rate

58 to 70, monitored rhythm sinus with frequent ventricular ectopics.

LUNGS:  Bilateral breath sounds.  Scattered rales on the bases

predominantly.

CARDIAC:  Regular rhythm and rate.  Frequent ectopic beats.  Normal S1,

paradoxically split S2.  1/6 systolic apical murmur.

EXTREMITIES:  With 1+ edema.



IMPRESSION:

1. Dilated cardiomyopathy.

2. Nonsustained ventricular ectopy.

3. 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

4. Acute on chronic systolic congestive heart failure.

5. Pleural effusion.

6. Chronic kidney disease with possible acute components.

7. Passive congestion of the liver.



PLAN:

1. Diuresis with intravenous loop diuretic.

2. Potassium replacement.

3. Titrate anti-failure regimen, which included beta-blockers and

cautious use of ACE inhibitors in view of renal impairment.









  ______________________________________________

  Maco Fernandez M.D.





DR:  CARLOS ALBERTO

D:  07/05/2019 01:46

T:  07/05/2019 01:59

JOB#:  [id number]

CC:



[facility]